# Patient Record
Sex: FEMALE | Race: BLACK OR AFRICAN AMERICAN | NOT HISPANIC OR LATINO
[De-identification: names, ages, dates, MRNs, and addresses within clinical notes are randomized per-mention and may not be internally consistent; named-entity substitution may affect disease eponyms.]

---

## 2017-10-02 ENCOUNTER — RESULT REVIEW (OUTPATIENT)
Age: 22
End: 2017-10-02

## 2017-10-19 PROBLEM — Z00.00 ENCOUNTER FOR PREVENTIVE HEALTH EXAMINATION: Status: ACTIVE | Noted: 2017-10-19

## 2017-11-07 ENCOUNTER — APPOINTMENT (OUTPATIENT)
Dept: ANTEPARTUM | Facility: CLINIC | Age: 22
End: 2017-11-07
Payer: COMMERCIAL

## 2017-11-07 ENCOUNTER — ASOB RESULT (OUTPATIENT)
Age: 22
End: 2017-11-07

## 2017-11-07 PROCEDURE — 76811 OB US DETAILED SNGL FETUS: CPT

## 2017-12-05 ENCOUNTER — APPOINTMENT (OUTPATIENT)
Dept: ANTEPARTUM | Facility: CLINIC | Age: 22
End: 2017-12-05
Payer: COMMERCIAL

## 2017-12-05 ENCOUNTER — ASOB RESULT (OUTPATIENT)
Age: 22
End: 2017-12-05

## 2017-12-05 ENCOUNTER — APPOINTMENT (OUTPATIENT)
Dept: ANTEPARTUM | Facility: HOSPITAL | Age: 22
End: 2017-12-05
Payer: COMMERCIAL

## 2017-12-05 PROCEDURE — 76816 OB US FOLLOW-UP PER FETUS: CPT

## 2017-12-05 PROCEDURE — 99203 OFFICE O/P NEW LOW 30 MIN: CPT | Mod: 25

## 2018-01-02 ENCOUNTER — APPOINTMENT (OUTPATIENT)
Dept: ANTEPARTUM | Facility: CLINIC | Age: 23
End: 2018-01-02
Payer: COMMERCIAL

## 2018-01-02 ENCOUNTER — ASOB RESULT (OUTPATIENT)
Age: 23
End: 2018-01-02

## 2018-01-02 PROCEDURE — 76816 OB US FOLLOW-UP PER FETUS: CPT

## 2018-01-02 PROCEDURE — 76819 FETAL BIOPHYS PROFIL W/O NST: CPT

## 2018-01-08 ENCOUNTER — APPOINTMENT (OUTPATIENT)
Dept: ANTEPARTUM | Facility: CLINIC | Age: 23
End: 2018-01-08
Payer: COMMERCIAL

## 2018-01-08 ENCOUNTER — ASOB RESULT (OUTPATIENT)
Age: 23
End: 2018-01-08

## 2018-01-08 PROCEDURE — 76819 FETAL BIOPHYS PROFIL W/O NST: CPT

## 2018-01-30 ENCOUNTER — ASOB RESULT (OUTPATIENT)
Age: 23
End: 2018-01-30

## 2018-01-30 ENCOUNTER — APPOINTMENT (OUTPATIENT)
Dept: ANTEPARTUM | Facility: HOSPITAL | Age: 23
End: 2018-01-30

## 2018-01-30 ENCOUNTER — OUTPATIENT (OUTPATIENT)
Dept: OUTPATIENT SERVICES | Facility: HOSPITAL | Age: 23
LOS: 1 days | End: 2018-01-30

## 2018-01-30 ENCOUNTER — APPOINTMENT (OUTPATIENT)
Dept: ANTEPARTUM | Facility: CLINIC | Age: 23
End: 2018-01-30
Payer: COMMERCIAL

## 2018-01-30 PROCEDURE — 76816 OB US FOLLOW-UP PER FETUS: CPT

## 2018-01-30 PROCEDURE — 76818 FETAL BIOPHYS PROFILE W/NST: CPT | Mod: 26

## 2018-02-02 DIAGNOSIS — O09.292 SUPERVISION OF PREGNANCY WITH OTHER POOR REPRODUCTIVE OR OBSTETRIC HISTORY, SECOND TRIMESTER: ICD-10-CM

## 2018-02-02 DIAGNOSIS — Z3A.32 32 WEEKS GESTATION OF PREGNANCY: ICD-10-CM

## 2018-02-06 ENCOUNTER — ASOB RESULT (OUTPATIENT)
Age: 23
End: 2018-02-06

## 2018-02-06 ENCOUNTER — OUTPATIENT (OUTPATIENT)
Dept: OUTPATIENT SERVICES | Facility: HOSPITAL | Age: 23
LOS: 1 days | End: 2018-02-06

## 2018-02-06 ENCOUNTER — APPOINTMENT (OUTPATIENT)
Dept: ANTEPARTUM | Facility: CLINIC | Age: 23
End: 2018-02-06
Payer: COMMERCIAL

## 2018-02-06 ENCOUNTER — APPOINTMENT (OUTPATIENT)
Dept: ANTEPARTUM | Facility: HOSPITAL | Age: 23
End: 2018-02-06

## 2018-02-06 PROCEDURE — 76818 FETAL BIOPHYS PROFILE W/NST: CPT | Mod: 26

## 2018-02-13 ENCOUNTER — APPOINTMENT (OUTPATIENT)
Dept: ANTEPARTUM | Facility: HOSPITAL | Age: 23
End: 2018-02-13

## 2018-02-13 ENCOUNTER — ASOB RESULT (OUTPATIENT)
Age: 23
End: 2018-02-13

## 2018-02-13 ENCOUNTER — APPOINTMENT (OUTPATIENT)
Dept: ANTEPARTUM | Facility: CLINIC | Age: 23
End: 2018-02-13
Payer: COMMERCIAL

## 2018-02-13 ENCOUNTER — OUTPATIENT (OUTPATIENT)
Dept: OUTPATIENT SERVICES | Facility: HOSPITAL | Age: 23
LOS: 1 days | End: 2018-02-13

## 2018-02-13 DIAGNOSIS — O09.292 SUPERVISION OF PREGNANCY WITH OTHER POOR REPRODUCTIVE OR OBSTETRIC HISTORY, SECOND TRIMESTER: ICD-10-CM

## 2018-02-13 DIAGNOSIS — Z3A.33 33 WEEKS GESTATION OF PREGNANCY: ICD-10-CM

## 2018-02-13 PROCEDURE — 76818 FETAL BIOPHYS PROFILE W/NST: CPT | Mod: 26

## 2018-02-20 ENCOUNTER — APPOINTMENT (OUTPATIENT)
Dept: ANTEPARTUM | Facility: HOSPITAL | Age: 23
End: 2018-02-20

## 2018-02-20 ENCOUNTER — OUTPATIENT (OUTPATIENT)
Dept: OUTPATIENT SERVICES | Facility: HOSPITAL | Age: 23
LOS: 1 days | End: 2018-02-20

## 2018-02-20 ENCOUNTER — APPOINTMENT (OUTPATIENT)
Dept: ANTEPARTUM | Facility: CLINIC | Age: 23
End: 2018-02-20
Payer: COMMERCIAL

## 2018-02-20 ENCOUNTER — ASOB RESULT (OUTPATIENT)
Age: 23
End: 2018-02-20

## 2018-02-20 PROCEDURE — 76816 OB US FOLLOW-UP PER FETUS: CPT

## 2018-02-20 PROCEDURE — 76818 FETAL BIOPHYS PROFILE W/NST: CPT | Mod: 26

## 2018-02-22 DIAGNOSIS — Z3A.35 35 WEEKS GESTATION OF PREGNANCY: ICD-10-CM

## 2018-02-22 DIAGNOSIS — O09.292 SUPERVISION OF PREGNANCY WITH OTHER POOR REPRODUCTIVE OR OBSTETRIC HISTORY, SECOND TRIMESTER: ICD-10-CM

## 2018-02-27 ENCOUNTER — OUTPATIENT (OUTPATIENT)
Dept: OUTPATIENT SERVICES | Facility: HOSPITAL | Age: 23
LOS: 1 days | End: 2018-02-27

## 2018-02-27 ENCOUNTER — ASOB RESULT (OUTPATIENT)
Age: 23
End: 2018-02-27

## 2018-02-27 ENCOUNTER — APPOINTMENT (OUTPATIENT)
Dept: ANTEPARTUM | Facility: CLINIC | Age: 23
End: 2018-02-27
Payer: COMMERCIAL

## 2018-02-27 ENCOUNTER — APPOINTMENT (OUTPATIENT)
Dept: ANTEPARTUM | Facility: HOSPITAL | Age: 23
End: 2018-02-27

## 2018-02-27 PROCEDURE — 76818 FETAL BIOPHYS PROFILE W/NST: CPT | Mod: 26

## 2018-02-28 DIAGNOSIS — O40.3XX0 POLYHYDRAMNIOS, THIRD TRIMESTER, NOT APPLICABLE OR UNSPECIFIED: ICD-10-CM

## 2018-02-28 DIAGNOSIS — Z3A.34 34 WEEKS GESTATION OF PREGNANCY: ICD-10-CM

## 2018-02-28 DIAGNOSIS — O09.292 SUPERVISION OF PREGNANCY WITH OTHER POOR REPRODUCTIVE OR OBSTETRIC HISTORY, SECOND TRIMESTER: ICD-10-CM

## 2018-03-01 DIAGNOSIS — Z3A.36 36 WEEKS GESTATION OF PREGNANCY: ICD-10-CM

## 2018-03-01 DIAGNOSIS — O40.3XX0 POLYHYDRAMNIOS, THIRD TRIMESTER, NOT APPLICABLE OR UNSPECIFIED: ICD-10-CM

## 2018-03-01 DIAGNOSIS — O09.293 SUPERVISION OF PREGNANCY WITH OTHER POOR REPRODUCTIVE OR OBSTETRIC HISTORY, THIRD TRIMESTER: ICD-10-CM

## 2018-03-02 ENCOUNTER — ASOB RESULT (OUTPATIENT)
Age: 23
End: 2018-03-02

## 2018-03-02 ENCOUNTER — APPOINTMENT (OUTPATIENT)
Dept: ANTEPARTUM | Facility: HOSPITAL | Age: 23
End: 2018-03-02
Payer: COMMERCIAL

## 2018-03-02 ENCOUNTER — OUTPATIENT (OUTPATIENT)
Dept: OUTPATIENT SERVICES | Facility: HOSPITAL | Age: 23
LOS: 1 days | End: 2018-03-02

## 2018-03-02 PROCEDURE — 59025 FETAL NON-STRESS TEST: CPT | Mod: 26

## 2018-03-06 ENCOUNTER — OUTPATIENT (OUTPATIENT)
Dept: OUTPATIENT SERVICES | Facility: HOSPITAL | Age: 23
LOS: 1 days | End: 2018-03-06

## 2018-03-06 ENCOUNTER — ASOB RESULT (OUTPATIENT)
Age: 23
End: 2018-03-06

## 2018-03-06 ENCOUNTER — APPOINTMENT (OUTPATIENT)
Dept: ANTEPARTUM | Facility: HOSPITAL | Age: 23
End: 2018-03-06

## 2018-03-06 ENCOUNTER — APPOINTMENT (OUTPATIENT)
Dept: ANTEPARTUM | Facility: CLINIC | Age: 23
End: 2018-03-06
Payer: COMMERCIAL

## 2018-03-06 PROCEDURE — 76818 FETAL BIOPHYS PROFILE W/NST: CPT | Mod: 26

## 2018-03-08 DIAGNOSIS — Z3A.37 37 WEEKS GESTATION OF PREGNANCY: ICD-10-CM

## 2018-03-08 DIAGNOSIS — O09.292 SUPERVISION OF PREGNANCY WITH OTHER POOR REPRODUCTIVE OR OBSTETRIC HISTORY, SECOND TRIMESTER: ICD-10-CM

## 2018-03-08 DIAGNOSIS — O40.3XX0 POLYHYDRAMNIOS, THIRD TRIMESTER, NOT APPLICABLE OR UNSPECIFIED: ICD-10-CM

## 2018-03-09 ENCOUNTER — ASOB RESULT (OUTPATIENT)
Age: 23
End: 2018-03-09

## 2018-03-09 ENCOUNTER — APPOINTMENT (OUTPATIENT)
Dept: ANTEPARTUM | Facility: HOSPITAL | Age: 23
End: 2018-03-09
Payer: COMMERCIAL

## 2018-03-09 ENCOUNTER — APPOINTMENT (OUTPATIENT)
Dept: ANTEPARTUM | Facility: CLINIC | Age: 23
End: 2018-03-09

## 2018-03-09 ENCOUNTER — OUTPATIENT (OUTPATIENT)
Dept: OUTPATIENT SERVICES | Facility: HOSPITAL | Age: 23
LOS: 1 days | End: 2018-03-09

## 2018-03-09 PROCEDURE — 59025 FETAL NON-STRESS TEST: CPT | Mod: 26

## 2018-03-13 ENCOUNTER — ASOB RESULT (OUTPATIENT)
Age: 23
End: 2018-03-13

## 2018-03-13 ENCOUNTER — APPOINTMENT (OUTPATIENT)
Dept: ANTEPARTUM | Facility: CLINIC | Age: 23
End: 2018-03-13
Payer: COMMERCIAL

## 2018-03-13 ENCOUNTER — OUTPATIENT (OUTPATIENT)
Dept: OUTPATIENT SERVICES | Facility: HOSPITAL | Age: 23
LOS: 1 days | End: 2018-03-13

## 2018-03-13 ENCOUNTER — APPOINTMENT (OUTPATIENT)
Dept: ANTEPARTUM | Facility: HOSPITAL | Age: 23
End: 2018-03-13

## 2018-03-13 DIAGNOSIS — O09.293 SUPERVISION OF PREGNANCY WITH OTHER POOR REPRODUCTIVE OR OBSTETRIC HISTORY, THIRD TRIMESTER: ICD-10-CM

## 2018-03-13 DIAGNOSIS — O40.3XX0 POLYHYDRAMNIOS, THIRD TRIMESTER, NOT APPLICABLE OR UNSPECIFIED: ICD-10-CM

## 2018-03-13 DIAGNOSIS — Z3A.37 37 WEEKS GESTATION OF PREGNANCY: ICD-10-CM

## 2018-03-13 PROCEDURE — 76818 FETAL BIOPHYS PROFILE W/NST: CPT | Mod: 26

## 2018-03-13 PROCEDURE — 76816 OB US FOLLOW-UP PER FETUS: CPT

## 2018-03-14 DIAGNOSIS — O09.293 SUPERVISION OF PREGNANCY WITH OTHER POOR REPRODUCTIVE OR OBSTETRIC HISTORY, THIRD TRIMESTER: ICD-10-CM

## 2018-03-14 DIAGNOSIS — Z3A.38 38 WEEKS GESTATION OF PREGNANCY: ICD-10-CM

## 2018-03-14 DIAGNOSIS — O40.3XX0 POLYHYDRAMNIOS, THIRD TRIMESTER, NOT APPLICABLE OR UNSPECIFIED: ICD-10-CM

## 2018-03-16 ENCOUNTER — OUTPATIENT (OUTPATIENT)
Dept: OUTPATIENT SERVICES | Facility: HOSPITAL | Age: 23
LOS: 1 days | End: 2018-03-16

## 2018-03-16 ENCOUNTER — ASOB RESULT (OUTPATIENT)
Age: 23
End: 2018-03-16

## 2018-03-16 ENCOUNTER — APPOINTMENT (OUTPATIENT)
Dept: ANTEPARTUM | Facility: HOSPITAL | Age: 23
End: 2018-03-16
Payer: COMMERCIAL

## 2018-03-16 PROCEDURE — 59025 FETAL NON-STRESS TEST: CPT | Mod: 26

## 2018-03-19 ENCOUNTER — OUTPATIENT (OUTPATIENT)
Dept: OUTPATIENT SERVICES | Facility: HOSPITAL | Age: 23
LOS: 1 days | End: 2018-03-19

## 2018-03-19 DIAGNOSIS — O40.3XX0 POLYHYDRAMNIOS, THIRD TRIMESTER, NOT APPLICABLE OR UNSPECIFIED: ICD-10-CM

## 2018-03-19 DIAGNOSIS — O09.293 SUPERVISION OF PREGNANCY WITH OTHER POOR REPRODUCTIVE OR OBSTETRIC HISTORY, THIRD TRIMESTER: ICD-10-CM

## 2018-03-19 DIAGNOSIS — O99.119 OTHER DISEASES OF THE BLOOD AND BLOOD-FORMING ORGANS AND CERTAIN DISORDERS INVOLVING THE IMMUNE MECHANISM COMPLICATING PREGNANCY, UNSPECIFIED TRIMESTER: ICD-10-CM

## 2018-03-19 DIAGNOSIS — Z3A.38 38 WEEKS GESTATION OF PREGNANCY: ICD-10-CM

## 2018-03-19 LAB
APPEARANCE UR: CLEAR — SIGNIFICANT CHANGE UP
BILIRUB UR-MCNC: NEGATIVE — SIGNIFICANT CHANGE UP
BLD GP AB SCN SERPL QL: NEGATIVE — SIGNIFICANT CHANGE UP
BLOOD UR QL VISUAL: NEGATIVE — SIGNIFICANT CHANGE UP
COLOR SPEC: YELLOW — SIGNIFICANT CHANGE UP
GLUCOSE UR-MCNC: 1000 — SIGNIFICANT CHANGE UP
HCT VFR BLD CALC: 33.8 % — LOW (ref 34.5–45)
HGB BLD-MCNC: 10.9 G/DL — LOW (ref 11.5–15.5)
KETONES UR-MCNC: NEGATIVE — SIGNIFICANT CHANGE UP
LEUKOCYTE ESTERASE UR-ACNC: NEGATIVE — SIGNIFICANT CHANGE UP
MCHC RBC-ENTMCNC: 27.2 PG — SIGNIFICANT CHANGE UP (ref 27–34)
MCHC RBC-ENTMCNC: 32.2 % — SIGNIFICANT CHANGE UP (ref 32–36)
MCV RBC AUTO: 84.3 FL — SIGNIFICANT CHANGE UP (ref 80–100)
MUCOUS THREADS # UR AUTO: SIGNIFICANT CHANGE UP
NITRITE UR-MCNC: NEGATIVE — SIGNIFICANT CHANGE UP
NON-SQ EPI CELLS # UR AUTO: <1 — SIGNIFICANT CHANGE UP
NRBC # FLD: 0 — SIGNIFICANT CHANGE UP
PH UR: 6.5 — SIGNIFICANT CHANGE UP (ref 4.6–8)
PLATELET # BLD AUTO: 115 K/UL — LOW (ref 150–400)
PMV BLD: 11.8 FL — SIGNIFICANT CHANGE UP (ref 7–13)
PROT UR-MCNC: 30 MG/DL — HIGH
RBC # BLD: 4.01 M/UL — SIGNIFICANT CHANGE UP (ref 3.8–5.2)
RBC # FLD: 15.7 % — HIGH (ref 10.3–14.5)
RBC CASTS # UR COMP ASSIST: SIGNIFICANT CHANGE UP (ref 0–?)
RH IG SCN BLD-IMP: POSITIVE — SIGNIFICANT CHANGE UP
SP GR SPEC: 1.02 — SIGNIFICANT CHANGE UP (ref 1–1.04)
SQUAMOUS # UR AUTO: SIGNIFICANT CHANGE UP
UROBILINOGEN FLD QL: NORMAL MG/DL — SIGNIFICANT CHANGE UP
WBC # BLD: 7.68 K/UL — SIGNIFICANT CHANGE UP (ref 3.8–10.5)
WBC # FLD AUTO: 7.68 K/UL — SIGNIFICANT CHANGE UP (ref 3.8–10.5)
WBC UR QL: SIGNIFICANT CHANGE UP (ref 0–?)

## 2018-03-20 ENCOUNTER — RESULT REVIEW (OUTPATIENT)
Age: 23
End: 2018-03-20

## 2018-03-20 ENCOUNTER — INPATIENT (INPATIENT)
Facility: HOSPITAL | Age: 23
LOS: 2 days | Discharge: HOME | End: 2018-03-23
Attending: OBSTETRICS & GYNECOLOGY | Admitting: OBSTETRICS & GYNECOLOGY
Payer: COMMERCIAL

## 2018-03-20 ENCOUNTER — TRANSCRIPTION ENCOUNTER (OUTPATIENT)
Age: 23
End: 2018-03-20

## 2018-03-20 VITALS — WEIGHT: 182.98 LBS | HEIGHT: 63 IN

## 2018-03-20 DIAGNOSIS — O99.119 OTHER DISEASES OF THE BLOOD AND BLOOD-FORMING ORGANS AND CERTAIN DISORDERS INVOLVING THE IMMUNE MECHANISM COMPLICATING PREGNANCY, UNSPECIFIED TRIMESTER: ICD-10-CM

## 2018-03-20 LAB
BASOPHILS # BLD AUTO: 0.02 K/UL — SIGNIFICANT CHANGE UP (ref 0–0.2)
BASOPHILS NFR BLD AUTO: 0.2 % — SIGNIFICANT CHANGE UP (ref 0–2)
EOSINOPHIL # BLD AUTO: 0.05 K/UL — SIGNIFICANT CHANGE UP (ref 0–0.5)
EOSINOPHIL NFR BLD AUTO: 0.6 % — SIGNIFICANT CHANGE UP (ref 0–6)
HCT VFR BLD CALC: 35.7 % — SIGNIFICANT CHANGE UP (ref 34.5–45)
HCT VFR BLD CALC: 36.1 % — SIGNIFICANT CHANGE UP (ref 34.5–45)
HGB BLD-MCNC: 11.6 G/DL — SIGNIFICANT CHANGE UP (ref 11.5–15.5)
HGB BLD-MCNC: 11.6 G/DL — SIGNIFICANT CHANGE UP (ref 11.5–15.5)
IMM GRANULOCYTES # BLD AUTO: 0.08 # — SIGNIFICANT CHANGE UP
IMM GRANULOCYTES NFR BLD AUTO: 0.9 % — SIGNIFICANT CHANGE UP (ref 0–1.5)
LYMPHOCYTES # BLD AUTO: 1.76 K/UL — SIGNIFICANT CHANGE UP (ref 1–3.3)
LYMPHOCYTES # BLD AUTO: 19.7 % — SIGNIFICANT CHANGE UP (ref 13–44)
MCHC RBC-ENTMCNC: 26.6 PG — LOW (ref 27–34)
MCHC RBC-ENTMCNC: 27.5 PG — SIGNIFICANT CHANGE UP (ref 27–34)
MCHC RBC-ENTMCNC: 32.1 % — SIGNIFICANT CHANGE UP (ref 32–36)
MCHC RBC-ENTMCNC: 32.5 % — SIGNIFICANT CHANGE UP (ref 32–36)
MCV RBC AUTO: 82.8 FL — SIGNIFICANT CHANGE UP (ref 80–100)
MCV RBC AUTO: 84.6 FL — SIGNIFICANT CHANGE UP (ref 80–100)
MONOCYTES # BLD AUTO: 0.71 K/UL — SIGNIFICANT CHANGE UP (ref 0–0.9)
MONOCYTES NFR BLD AUTO: 8 % — SIGNIFICANT CHANGE UP (ref 2–14)
NEUTROPHILS # BLD AUTO: 6.31 K/UL — SIGNIFICANT CHANGE UP (ref 1.8–7.4)
NEUTROPHILS NFR BLD AUTO: 70.6 % — SIGNIFICANT CHANGE UP (ref 43–77)
NRBC # FLD: 0 — SIGNIFICANT CHANGE UP
NRBC # FLD: 0 — SIGNIFICANT CHANGE UP
PLATELET # BLD AUTO: 102 K/UL — LOW (ref 150–400)
PLATELET # BLD AUTO: 109 K/UL — LOW (ref 150–400)
PMV BLD: 11.7 FL — SIGNIFICANT CHANGE UP (ref 7–13)
PMV BLD: 11.9 FL — SIGNIFICANT CHANGE UP (ref 7–13)
RBC # BLD: 4.22 M/UL — SIGNIFICANT CHANGE UP (ref 3.8–5.2)
RBC # BLD: 4.36 M/UL — SIGNIFICANT CHANGE UP (ref 3.8–5.2)
RBC # FLD: 15.8 % — HIGH (ref 10.3–14.5)
RBC # FLD: 16 % — HIGH (ref 10.3–14.5)
RH IG SCN BLD-IMP: POSITIVE — SIGNIFICANT CHANGE UP
T PALLIDUM AB TITR SER: NEGATIVE — SIGNIFICANT CHANGE UP
WBC # BLD: 8.32 K/UL — SIGNIFICANT CHANGE UP (ref 3.8–10.5)
WBC # BLD: 8.93 K/UL — SIGNIFICANT CHANGE UP (ref 3.8–10.5)
WBC # FLD AUTO: 8.32 K/UL — SIGNIFICANT CHANGE UP (ref 3.8–10.5)
WBC # FLD AUTO: 8.93 K/UL — SIGNIFICANT CHANGE UP (ref 3.8–10.5)

## 2018-03-20 RX ORDER — OXYTOCIN 10 UNIT/ML
333.33 VIAL (ML) INJECTION
Qty: 20 | Refills: 0 | Status: DISCONTINUED | OUTPATIENT
Start: 2018-03-20 | End: 2018-03-20

## 2018-03-20 RX ORDER — AMPICILLIN TRIHYDRATE 250 MG
CAPSULE ORAL
Qty: 0 | Refills: 0 | Status: DISCONTINUED | OUTPATIENT
Start: 2018-03-20 | End: 2018-03-21

## 2018-03-20 RX ORDER — AMPICILLIN TRIHYDRATE 250 MG
1 CAPSULE ORAL EVERY 4 HOURS
Qty: 0 | Refills: 0 | Status: DISCONTINUED | OUTPATIENT
Start: 2018-03-20 | End: 2018-03-21

## 2018-03-20 RX ORDER — AMPICILLIN TRIHYDRATE 250 MG
2 CAPSULE ORAL ONCE
Qty: 0 | Refills: 0 | Status: COMPLETED | OUTPATIENT
Start: 2018-03-20 | End: 2018-03-20

## 2018-03-20 RX ORDER — INFLUENZA VIRUS VACCINE 15; 15; 15; 15 UG/.5ML; UG/.5ML; UG/.5ML; UG/.5ML
0.5 SUSPENSION INTRAMUSCULAR ONCE
Qty: 0 | Refills: 0 | Status: DISCONTINUED | OUTPATIENT
Start: 2018-03-20 | End: 2018-03-23

## 2018-03-20 RX ORDER — OXYTOCIN 10 UNIT/ML
2 VIAL (ML) INJECTION
Qty: 30 | Refills: 0 | Status: DISCONTINUED | OUTPATIENT
Start: 2018-03-20 | End: 2018-03-21

## 2018-03-20 RX ORDER — CITRIC ACID/SODIUM CITRATE 300-500 MG
15 SOLUTION, ORAL ORAL EVERY 4 HOURS
Qty: 0 | Refills: 0 | Status: DISCONTINUED | OUTPATIENT
Start: 2018-03-20 | End: 2018-03-20

## 2018-03-20 RX ORDER — SODIUM CHLORIDE 9 MG/ML
1000 INJECTION, SOLUTION INTRAVENOUS ONCE
Qty: 0 | Refills: 0 | Status: DISCONTINUED | OUTPATIENT
Start: 2018-03-20 | End: 2018-03-20

## 2018-03-20 RX ORDER — SODIUM CHLORIDE 9 MG/ML
1000 INJECTION, SOLUTION INTRAVENOUS
Qty: 0 | Refills: 0 | Status: DISCONTINUED | OUTPATIENT
Start: 2018-03-20 | End: 2018-03-20

## 2018-03-20 RX ADMIN — Medication 108 GRAM(S): at 14:42

## 2018-03-20 RX ADMIN — Medication 216 GRAM(S): at 10:49

## 2018-03-20 RX ADMIN — Medication 108 GRAM(S): at 23:00

## 2018-03-20 RX ADMIN — Medication 108 GRAM(S): at 18:46

## 2018-03-20 RX ADMIN — Medication 2 MILLIUNIT(S)/MIN: at 23:42

## 2018-03-20 RX ADMIN — SODIUM CHLORIDE 125 MILLILITER(S): 9 INJECTION, SOLUTION INTRAVENOUS at 10:50

## 2018-03-21 LAB
ALBUMIN SERPL ELPH-MCNC: 3.5 G/DL — SIGNIFICANT CHANGE UP (ref 3.3–5)
ALP SERPL-CCNC: 145 U/L — HIGH (ref 40–120)
ALT FLD-CCNC: 16 U/L — SIGNIFICANT CHANGE UP (ref 4–33)
AST SERPL-CCNC: 30 U/L — SIGNIFICANT CHANGE UP (ref 4–32)
BILIRUB SERPL-MCNC: 0.3 MG/DL — SIGNIFICANT CHANGE UP (ref 0.2–1.2)
BUN SERPL-MCNC: 9 MG/DL — SIGNIFICANT CHANGE UP (ref 7–23)
CALCIUM SERPL-MCNC: 9 MG/DL — SIGNIFICANT CHANGE UP (ref 8.4–10.5)
CHLORIDE SERPL-SCNC: 102 MMOL/L — SIGNIFICANT CHANGE UP (ref 98–107)
CO2 SERPL-SCNC: 24 MMOL/L — SIGNIFICANT CHANGE UP (ref 22–31)
CREAT SERPL-MCNC: 0.64 MG/DL — SIGNIFICANT CHANGE UP (ref 0.5–1.3)
FIBRINOGEN PPP-MCNC: 582 MG/DL — HIGH (ref 310–510)
GLUCOSE SERPL-MCNC: 102 MG/DL — HIGH (ref 70–99)
HCT VFR BLD CALC: 35.3 % — SIGNIFICANT CHANGE UP (ref 34.5–45)
HGB BLD-MCNC: 11.6 G/DL — SIGNIFICANT CHANGE UP (ref 11.5–15.5)
INR BLD: 0.99 — SIGNIFICANT CHANGE UP (ref 0.88–1.17)
LDH SERPL L TO P-CCNC: 263 U/L — HIGH (ref 135–225)
MCHC RBC-ENTMCNC: 27.8 PG — SIGNIFICANT CHANGE UP (ref 27–34)
MCHC RBC-ENTMCNC: 32.9 % — SIGNIFICANT CHANGE UP (ref 32–36)
MCV RBC AUTO: 84.4 FL — SIGNIFICANT CHANGE UP (ref 80–100)
NRBC # FLD: 0 — SIGNIFICANT CHANGE UP
PLATELET # BLD AUTO: 104 K/UL — LOW (ref 150–400)
PMV BLD: 11.3 FL — SIGNIFICANT CHANGE UP (ref 7–13)
POTASSIUM SERPL-MCNC: 3.6 MMOL/L — SIGNIFICANT CHANGE UP (ref 3.5–5.3)
POTASSIUM SERPL-SCNC: 3.6 MMOL/L — SIGNIFICANT CHANGE UP (ref 3.5–5.3)
PROT SERPL-MCNC: 6.2 G/DL — SIGNIFICANT CHANGE UP (ref 6–8.3)
PROTHROM AB SERPL-ACNC: 11 SEC — SIGNIFICANT CHANGE UP (ref 9.8–13.1)
RBC # BLD: 4.18 M/UL — SIGNIFICANT CHANGE UP (ref 3.8–5.2)
RBC # FLD: 15.8 % — HIGH (ref 10.3–14.5)
SODIUM SERPL-SCNC: 139 MMOL/L — SIGNIFICANT CHANGE UP (ref 135–145)
T PALLIDUM AB TITR SER: NEGATIVE — SIGNIFICANT CHANGE UP
URATE SERPL-MCNC: 3.6 MG/DL — SIGNIFICANT CHANGE UP (ref 2.5–7)
WBC # BLD: 9.66 K/UL — SIGNIFICANT CHANGE UP (ref 3.8–10.5)
WBC # FLD AUTO: 9.66 K/UL — SIGNIFICANT CHANGE UP (ref 3.8–10.5)

## 2018-03-21 PROCEDURE — 88307 TISSUE EXAM BY PATHOLOGIST: CPT | Mod: 26

## 2018-03-21 RX ORDER — MAGNESIUM HYDROXIDE 400 MG/1
30 TABLET, CHEWABLE ORAL
Qty: 0 | Refills: 0 | Status: DISCONTINUED | OUTPATIENT
Start: 2018-03-21 | End: 2018-03-23

## 2018-03-21 RX ORDER — SIMETHICONE 80 MG/1
80 TABLET, CHEWABLE ORAL EVERY 6 HOURS
Qty: 0 | Refills: 0 | Status: DISCONTINUED | OUTPATIENT
Start: 2018-03-21 | End: 2018-03-23

## 2018-03-21 RX ORDER — DIBUCAINE 1 %
1 OINTMENT (GRAM) RECTAL EVERY 4 HOURS
Qty: 0 | Refills: 0 | Status: DISCONTINUED | OUTPATIENT
Start: 2018-03-21 | End: 2018-03-21

## 2018-03-21 RX ORDER — PRAMOXINE HYDROCHLORIDE 150 MG/15G
1 AEROSOL, FOAM RECTAL EVERY 4 HOURS
Qty: 0 | Refills: 0 | Status: DISCONTINUED | OUTPATIENT
Start: 2018-03-21 | End: 2018-03-21

## 2018-03-21 RX ORDER — AER TRAVELER 0.5 G/1
1 SOLUTION RECTAL; TOPICAL EVERY 4 HOURS
Qty: 0 | Refills: 0 | Status: DISCONTINUED | OUTPATIENT
Start: 2018-03-21 | End: 2018-03-23

## 2018-03-21 RX ORDER — OXYCODONE HYDROCHLORIDE 5 MG/1
5 TABLET ORAL EVERY 4 HOURS
Qty: 0 | Refills: 0 | Status: DISCONTINUED | OUTPATIENT
Start: 2018-03-21 | End: 2018-03-23

## 2018-03-21 RX ORDER — OXYTOCIN 10 UNIT/ML
41.67 VIAL (ML) INJECTION
Qty: 20 | Refills: 0 | Status: DISCONTINUED | OUTPATIENT
Start: 2018-03-21 | End: 2018-03-22

## 2018-03-21 RX ORDER — DIPHENHYDRAMINE HCL 50 MG
25 CAPSULE ORAL EVERY 6 HOURS
Qty: 0 | Refills: 0 | Status: DISCONTINUED | OUTPATIENT
Start: 2018-03-21 | End: 2018-03-23

## 2018-03-21 RX ORDER — DOCUSATE SODIUM 100 MG
100 CAPSULE ORAL
Qty: 0 | Refills: 0 | Status: DISCONTINUED | OUTPATIENT
Start: 2018-03-21 | End: 2018-03-23

## 2018-03-21 RX ORDER — HYDROCORTISONE 1 %
1 OINTMENT (GRAM) TOPICAL EVERY 4 HOURS
Qty: 0 | Refills: 0 | Status: DISCONTINUED | OUTPATIENT
Start: 2018-03-21 | End: 2018-03-21

## 2018-03-21 RX ORDER — KETOROLAC TROMETHAMINE 30 MG/ML
30 SYRINGE (ML) INJECTION ONCE
Qty: 0 | Refills: 0 | Status: DISCONTINUED | OUTPATIENT
Start: 2018-03-21 | End: 2018-03-22

## 2018-03-21 RX ORDER — IBUPROFEN 200 MG
600 TABLET ORAL EVERY 6 HOURS
Qty: 0 | Refills: 0 | Status: DISCONTINUED | OUTPATIENT
Start: 2018-03-21 | End: 2018-03-23

## 2018-03-21 RX ORDER — AER TRAVELER 0.5 G/1
1 SOLUTION RECTAL; TOPICAL EVERY 4 HOURS
Qty: 0 | Refills: 0 | Status: DISCONTINUED | OUTPATIENT
Start: 2018-03-21 | End: 2018-03-21

## 2018-03-21 RX ORDER — ACETAMINOPHEN 500 MG
975 TABLET ORAL EVERY 6 HOURS
Qty: 0 | Refills: 0 | Status: COMPLETED | OUTPATIENT
Start: 2018-03-21 | End: 2019-02-17

## 2018-03-21 RX ORDER — TETANUS TOXOID, REDUCED DIPHTHERIA TOXOID AND ACELLULAR PERTUSSIS VACCINE, ADSORBED 5; 2.5; 8; 8; 2.5 [IU]/.5ML; [IU]/.5ML; UG/.5ML; UG/.5ML; UG/.5ML
0.5 SUSPENSION INTRAMUSCULAR ONCE
Qty: 0 | Refills: 0 | Status: DISCONTINUED | OUTPATIENT
Start: 2018-03-21 | End: 2018-03-23

## 2018-03-21 RX ORDER — OXYCODONE HYDROCHLORIDE 5 MG/1
5 TABLET ORAL
Qty: 0 | Refills: 0 | Status: DISCONTINUED | OUTPATIENT
Start: 2018-03-21 | End: 2018-03-23

## 2018-03-21 RX ORDER — LANOLIN
1 OINTMENT (GRAM) TOPICAL EVERY 6 HOURS
Qty: 0 | Refills: 0 | Status: DISCONTINUED | OUTPATIENT
Start: 2018-03-21 | End: 2018-03-23

## 2018-03-21 RX ORDER — IBUPROFEN 200 MG
600 TABLET ORAL EVERY 6 HOURS
Qty: 0 | Refills: 0 | Status: COMPLETED | OUTPATIENT
Start: 2018-03-21 | End: 2019-02-17

## 2018-03-21 RX ORDER — HYDROCORTISONE 1 %
1 OINTMENT (GRAM) TOPICAL EVERY 4 HOURS
Qty: 0 | Refills: 0 | Status: DISCONTINUED | OUTPATIENT
Start: 2018-03-21 | End: 2018-03-23

## 2018-03-21 RX ORDER — PRAMOXINE HYDROCHLORIDE 150 MG/15G
1 AEROSOL, FOAM RECTAL EVERY 4 HOURS
Qty: 0 | Refills: 0 | Status: DISCONTINUED | OUTPATIENT
Start: 2018-03-21 | End: 2018-03-23

## 2018-03-21 RX ORDER — ACETAMINOPHEN 500 MG
975 TABLET ORAL EVERY 6 HOURS
Qty: 0 | Refills: 0 | Status: DISCONTINUED | OUTPATIENT
Start: 2018-03-21 | End: 2018-03-23

## 2018-03-21 RX ORDER — GLYCERIN ADULT
1 SUPPOSITORY, RECTAL RECTAL AT BEDTIME
Qty: 0 | Refills: 0 | Status: DISCONTINUED | OUTPATIENT
Start: 2018-03-21 | End: 2018-03-23

## 2018-03-21 RX ORDER — OXYTOCIN 10 UNIT/ML
41.67 VIAL (ML) INJECTION
Qty: 20 | Refills: 0 | Status: DISCONTINUED | OUTPATIENT
Start: 2018-03-21 | End: 2018-03-21

## 2018-03-21 RX ORDER — SODIUM CHLORIDE 9 MG/ML
3 INJECTION INTRAMUSCULAR; INTRAVENOUS; SUBCUTANEOUS EVERY 8 HOURS
Qty: 0 | Refills: 0 | Status: DISCONTINUED | OUTPATIENT
Start: 2018-03-21 | End: 2018-03-21

## 2018-03-21 RX ORDER — DIBUCAINE 1 %
1 OINTMENT (GRAM) RECTAL EVERY 4 HOURS
Qty: 0 | Refills: 0 | Status: DISCONTINUED | OUTPATIENT
Start: 2018-03-21 | End: 2018-03-23

## 2018-03-21 RX ORDER — SODIUM CHLORIDE 9 MG/ML
3 INJECTION INTRAMUSCULAR; INTRAVENOUS; SUBCUTANEOUS EVERY 8 HOURS
Qty: 0 | Refills: 0 | Status: DISCONTINUED | OUTPATIENT
Start: 2018-03-21 | End: 2018-03-23

## 2018-03-21 RX ADMIN — Medication 975 MILLIGRAM(S): at 06:05

## 2018-03-21 RX ADMIN — Medication 975 MILLIGRAM(S): at 05:35

## 2018-03-21 RX ADMIN — Medication 600 MILLIGRAM(S): at 14:00

## 2018-03-21 RX ADMIN — SODIUM CHLORIDE 3 MILLILITER(S): 9 INJECTION INTRAMUSCULAR; INTRAVENOUS; SUBCUTANEOUS at 22:22

## 2018-03-21 RX ADMIN — Medication 975 MILLIGRAM(S): at 13:01

## 2018-03-21 RX ADMIN — Medication 975 MILLIGRAM(S): at 14:00

## 2018-03-21 RX ADMIN — Medication 600 MILLIGRAM(S): at 13:01

## 2018-03-21 RX ADMIN — Medication 600 MILLIGRAM(S): at 22:30

## 2018-03-21 RX ADMIN — SIMETHICONE 80 MILLIGRAM(S): 80 TABLET, CHEWABLE ORAL at 22:36

## 2018-03-21 RX ADMIN — Medication 125 MILLIUNIT(S)/MIN: at 03:00

## 2018-03-21 RX ADMIN — Medication 975 MILLIGRAM(S): at 23:00

## 2018-03-21 RX ADMIN — Medication 600 MILLIGRAM(S): at 06:05

## 2018-03-21 RX ADMIN — Medication 600 MILLIGRAM(S): at 05:35

## 2018-03-21 RX ADMIN — Medication 975 MILLIGRAM(S): at 22:30

## 2018-03-21 RX ADMIN — Medication 600 MILLIGRAM(S): at 23:00

## 2018-03-21 RX ADMIN — SODIUM CHLORIDE 3 MILLILITER(S): 9 INJECTION INTRAMUSCULAR; INTRAVENOUS; SUBCUTANEOUS at 06:50

## 2018-03-21 NOTE — DISCHARGE NOTE OB - CARE PROVIDER_API CALL
Le Mcdonald (DO), Obstetrics and Gynecology  [address] [place] [address]  Phone: [phone]  Fax: [phone]

## 2018-03-21 NOTE — DISCHARGE NOTE OB - HOSPITAL COURSE
patient was admitted for IOL. She underwent vaginal delivery of a viable boy. Hospital course was uncomplicated. Patient discharged home to follow up with obstetrician.

## 2018-03-21 NOTE — DISCHARGE NOTE OB - PATIENT PORTAL LINK FT
You can access the [facility] Patient Portal, offered by [facility], by registering with the following website: [email or web address]

## 2018-03-21 NOTE — DISCHARGE NOTE OB - CARE PLAN
Principal Discharge DX:	Term birth of male   Goal:	full recovery  Assessment and plan of treatment:	Pelvic rest x 6wks  Regular diet  Continue prenatal vitamins

## 2018-03-21 NOTE — DISCHARGE NOTE OB - MEDICATION SUMMARY - MEDICATIONS TO TAKE
I will START or STAY ON the medications listed below when I get home from the hospital:    acetaminophen 325 mg oral tablet  -- 3 tab(s) by mouth every 6 hours  -- Indication: For pain    ibuprofen 600 mg oral tablet  -- 1 tab(s) by mouth every 6 hours  -- Indication: For pain    Prenatal Multivitamins with Folic Acid 1 mg oral tablet  -- 1 tab(s) by mouth once a day  -- Indication: For vitamins

## 2018-03-21 NOTE — PROGRESS NOTE ADULT - ASSESSMENT
Assessment: and Plan:    1. PPD#0 s/p  after IOL due to High risk Pregnancy (Hx of PEC and IUFD with 1st pregnancy)  2. Gest. Thrombocytopenia  3. Labile SBP >140    Pt. has hx of PEC and IUFD  Will check PEC labs,   ,0000  Will need Magnesium prophylaxis if Abn. labs  Continue current care  Needs Circ for Kismet tomorrow if cleared  Otherwise stable postpartum  Breastfeeding well

## 2018-03-21 NOTE — PROGRESS NOTE ADULT - SUBJECTIVE AND OBJECTIVE BOX
OB Attending on call: PPD # 0, s/p     Patient evaluated at bedside.   Patient's pain is well controlled with motrin and tylenol. Pt. denies any RUQ pain, visual disturbances, headache, dizziness, chest pain, palpitations, shortness of breath, nausea, vomiting, heavy vaginal bleeding or perineal discomfort.  Patient has been ambulating without assistance, voiding spontaneously, tolerating diet, and is breastfeeding.    Physical Exam:  Vital Signs Last 24 Hrs  T(C): 37.1 (21 Mar 2018 14:05), Max: 37.5 (21 Mar 2018 05:03)  T(F): 98.8 (21 Mar 2018 14:05), Max: 99.5 (21 Mar 2018 05:03)  HR: 99 (21 Mar 2018 14:05) (95 - 110)  BP: 142/82 (21 Mar 2018 14:05) (130/71 - 147/82)  BP(mean): --  RR: 18 (21 Mar 2018 14:05) (17 - 18)  SpO2: 99% (21 Mar 2018 14:05) (98% - 100%)  I&O's Summary    20 Mar 2018 07:01  -  21 Mar 2018 07:00  --------------------------------------------------------  IN: 750 mL / OUT: 850 mL / NET: -100 mL    21 Mar 2018 07:  -  21 Mar 2018 14:55  --------------------------------------------------------  IN: 0 mL / OUT: 400 mL / NET: -400 mL        NAD, A+0 x 3  CVS: Positive S1S2, Mild Tachycardia  Lungs: CTA B/L, No W/R/R  Breasts: soft, nontender, no palpable masses, nipples intact and everted  Abd:  soft, nontender, nondistended, no rebound or guarding, uterus firm at midline,   : lochia WNL  Extremities: no edema or calf tenderness bilaterally                        11.6   9.66  )-----------( 104      ( 21 Mar 2018 02:45 )             35.3                         11.6   8.32  )-----------( 102      ( 20 Mar 2018 19:30 )             36.1                         11.6   8.93  )-----------( 109      ( 20 Mar 2018 10:00 )             35.7                         10.9   7.68  )-----------( 115      ( 19 Mar 2018 18:15 )             33.8

## 2018-03-22 RX ORDER — IBUPROFEN 200 MG
1 TABLET ORAL
Qty: 0 | Refills: 0 | DISCHARGE
Start: 2018-03-22

## 2018-03-22 RX ORDER — ACETAMINOPHEN 500 MG
3 TABLET ORAL
Qty: 0 | Refills: 0 | DISCHARGE
Start: 2018-03-22

## 2018-03-22 RX ADMIN — Medication 600 MILLIGRAM(S): at 11:20

## 2018-03-22 RX ADMIN — SODIUM CHLORIDE 3 MILLILITER(S): 9 INJECTION INTRAMUSCULAR; INTRAVENOUS; SUBCUTANEOUS at 06:10

## 2018-03-22 RX ADMIN — Medication 600 MILLIGRAM(S): at 23:30

## 2018-03-22 RX ADMIN — Medication 975 MILLIGRAM(S): at 16:15

## 2018-03-22 RX ADMIN — Medication 975 MILLIGRAM(S): at 22:56

## 2018-03-22 RX ADMIN — Medication 600 MILLIGRAM(S): at 22:56

## 2018-03-22 RX ADMIN — Medication 975 MILLIGRAM(S): at 23:30

## 2018-03-22 RX ADMIN — Medication 1 TABLET(S): at 10:41

## 2018-03-22 RX ADMIN — Medication 975 MILLIGRAM(S): at 11:20

## 2018-03-22 RX ADMIN — Medication 975 MILLIGRAM(S): at 04:32

## 2018-03-22 RX ADMIN — Medication 975 MILLIGRAM(S): at 10:41

## 2018-03-22 RX ADMIN — Medication 600 MILLIGRAM(S): at 15:32

## 2018-03-22 RX ADMIN — Medication 975 MILLIGRAM(S): at 15:32

## 2018-03-22 RX ADMIN — Medication 600 MILLIGRAM(S): at 04:03

## 2018-03-22 RX ADMIN — Medication 600 MILLIGRAM(S): at 04:32

## 2018-03-22 RX ADMIN — Medication 600 MILLIGRAM(S): at 10:41

## 2018-03-22 RX ADMIN — Medication 975 MILLIGRAM(S): at 04:02

## 2018-03-22 RX ADMIN — Medication 100 MILLIGRAM(S): at 10:41

## 2018-03-22 RX ADMIN — Medication 600 MILLIGRAM(S): at 16:15

## 2018-03-22 NOTE — PROGRESS NOTE ADULT - SUBJECTIVE AND OBJECTIVE BOX
S: 22y PPD#1  Patient doing well. Minimal to moderate. lochia. Pain controlled. Voiding. Passing Flatus. Tolerating a regular diet.     O: Vital Signs Last 24 Hrs  T(C): 36.9 (22 Mar 2018 10:13), Max: 37.1 (21 Mar 2018 14:05)  T(F): 98.4 (22 Mar 2018 10:13), Max: 98.8 (21 Mar 2018 14:05)  HR: 102 (22 Mar 2018 10:13) (83 - 105)  BP: 138/88 (22 Mar 2018 10:13) (121/72 - 142/82)  RR: 18 (22 Mar 2018 10:13) (18 - 18)  SpO2: 99% (22 Mar 2018 10:13) (99% - 100%)    Gen: NAD  Abd: soft, NT, ND, fundus firm below umbilicus  Lochia: moderate  Ext: no tenderness    Labs:                        11.6   9.66  )-----------( 104      ( 21 Mar 2018 02:45 ), platelets, stable              35.3         A: 22y PPD#1 s/p  doing well. Circ. done.  Plan: Increase ambulation. Continue Regular diet. Tylenol and Motrin q6 hrs. Oxycodone prn.

## 2018-03-22 NOTE — PROGRESS NOTE ADULT - SUBJECTIVE AND OBJECTIVE BOX
Anesthesia Post-op Note    Patient s/p  with epidural yesterday.     No residual anesthetic issues or complications noted.

## 2018-03-23 VITALS
DIASTOLIC BLOOD PRESSURE: 82 MMHG | OXYGEN SATURATION: 100 % | SYSTOLIC BLOOD PRESSURE: 135 MMHG | RESPIRATION RATE: 18 BRPM | HEART RATE: 91 BPM | TEMPERATURE: 98 F

## 2018-03-23 RX ADMIN — Medication 600 MILLIGRAM(S): at 05:45

## 2018-03-23 RX ADMIN — Medication 975 MILLIGRAM(S): at 06:15

## 2018-03-23 RX ADMIN — Medication 600 MILLIGRAM(S): at 06:15

## 2018-03-23 RX ADMIN — Medication 975 MILLIGRAM(S): at 05:45

## 2018-03-23 NOTE — PROGRESS NOTE ADULT - SUBJECTIVE AND OBJECTIVE BOX
PPD #2  Patient evaluated at bedside.   Patient's pain is well controlled   Patient denies headache, dizziness, chest pain, palpitations, shortness of breath, nausea, vomiting, heavy vaginal bleeding or perineal discomfort.  Patient has been ambulating without assistance, voiding spontaneously, tolerating diet    Physical Exam:  Vital Signs Last 24 Hrs  T(C): 36.8 (23 Mar 2018 05:56), Max: 36.9 (22 Mar 2018 10:13)  T(F): 98.2 (23 Mar 2018 05:56), Max: 98.4 (22 Mar 2018 10:13)  HR: 91 (23 Mar 2018 05:56) (91 - 102)  BP: 135/82 (23 Mar 2018 05:56) (135/82 - 138/88)  BP(mean): --  RR: 18 (23 Mar 2018 05:56) (18 - 18)  SpO2: 100% (23 Mar 2018 05:56) (99% - 100%)  I&O's Summary    NAD, A+0 x 3    lochia WNL                        11.6   9.66  )-----------( 104      ( 21 Mar 2018 02:45 )             35.3                         11.6   8.32  )-----------( 102      ( 20 Mar 2018 19:30 )             36.1                         11.6   8.93  )-----------( 109      ( 20 Mar 2018 10:00 )             35.7                         10.9   7.68  )-----------( 115      ( 19 Mar 2018 18:15 )             33.8     03-21    139  |  102  |  9   ----------------------------<  102<H>  3.6   |  24  |  0.64    Ca    9.0      21 Mar 2018 16:05    TPro  6.2  /  Alb  3.5  /  TBili  0.3  /  DBili  x   /  AST  30  /  ALT  16  /  AlkPhos  145<H>  03-21    PT/INR - ( 21 Mar 2018 16:05 )   PT: 11.0 SEC;   INR: 0.99                Assessment:  stable postpartum    Plan:  encourage ambulation and po fluids  Encourage breastfeeding  discharge home

## 2018-03-27 DIAGNOSIS — O40.3XX0 POLYHYDRAMNIOS, THIRD TRIMESTER, NOT APPLICABLE OR UNSPECIFIED: ICD-10-CM

## 2018-03-27 DIAGNOSIS — O09.292 SUPERVISION OF PREGNANCY WITH OTHER POOR REPRODUCTIVE OR OBSTETRIC HISTORY, SECOND TRIMESTER: ICD-10-CM

## 2018-03-27 DIAGNOSIS — Z3A.39 39 WEEKS GESTATION OF PREGNANCY: ICD-10-CM

## 2018-04-28 LAB — SURGICAL PATHOLOGY STUDY: SIGNIFICANT CHANGE UP

## 2019-09-26 ENCOUNTER — ASOB RESULT (OUTPATIENT)
Age: 24
End: 2019-09-26

## 2019-09-26 ENCOUNTER — APPOINTMENT (OUTPATIENT)
Dept: ANTEPARTUM | Facility: CLINIC | Age: 24
End: 2019-09-26
Payer: COMMERCIAL

## 2019-09-26 PROCEDURE — 76811 OB US DETAILED SNGL FETUS: CPT

## 2019-09-26 PROCEDURE — 99214 OFFICE O/P EST MOD 30 MIN: CPT | Mod: 25

## 2019-09-26 PROCEDURE — 76819 FETAL BIOPHYS PROFIL W/O NST: CPT

## 2019-10-17 ENCOUNTER — APPOINTMENT (OUTPATIENT)
Dept: ANTEPARTUM | Facility: CLINIC | Age: 24
End: 2019-10-17

## 2019-10-28 ENCOUNTER — APPOINTMENT (OUTPATIENT)
Dept: ANTEPARTUM | Facility: CLINIC | Age: 24
End: 2019-10-28

## 2019-11-04 ENCOUNTER — APPOINTMENT (OUTPATIENT)
Dept: ANTEPARTUM | Facility: CLINIC | Age: 24
End: 2019-11-04

## 2019-12-12 ENCOUNTER — OUTPATIENT (OUTPATIENT)
Dept: OUTPATIENT SERVICES | Facility: HOSPITAL | Age: 24
LOS: 1 days | End: 2019-12-12

## 2019-12-12 ENCOUNTER — APPOINTMENT (OUTPATIENT)
Dept: ANTEPARTUM | Facility: CLINIC | Age: 24
End: 2019-12-12
Payer: COMMERCIAL

## 2019-12-12 ENCOUNTER — ASOB RESULT (OUTPATIENT)
Age: 24
End: 2019-12-12

## 2019-12-12 PROCEDURE — 76818 FETAL BIOPHYS PROFILE W/NST: CPT | Mod: 26

## 2019-12-12 PROCEDURE — 76816 OB US FOLLOW-UP PER FETUS: CPT

## 2019-12-13 ENCOUNTER — INPATIENT (INPATIENT)
Facility: HOSPITAL | Age: 24
LOS: 2 days | Discharge: HOME | End: 2019-12-16
Attending: OBSTETRICS & GYNECOLOGY | Admitting: OBSTETRICS & GYNECOLOGY

## 2019-12-13 VITALS — SYSTOLIC BLOOD PRESSURE: 127 MMHG | DIASTOLIC BLOOD PRESSURE: 69 MMHG | HEART RATE: 111 BPM

## 2019-12-13 DIAGNOSIS — Z98.890 OTHER SPECIFIED POSTPROCEDURAL STATES: Chronic | ICD-10-CM

## 2019-12-13 DIAGNOSIS — O09.299 SUPERVISION OF PREGNANCY WITH OTHER POOR REPRODUCTIVE OR OBSTETRIC HISTORY, UNSPECIFIED TRIMESTER: ICD-10-CM

## 2019-12-13 LAB
ALBUMIN SERPL ELPH-MCNC: 3.6 G/DL — SIGNIFICANT CHANGE UP (ref 3.3–5)
ALP SERPL-CCNC: 129 U/L — HIGH (ref 40–120)
ALT FLD-CCNC: 12 U/L — SIGNIFICANT CHANGE UP (ref 4–33)
ANION GAP SERPL CALC-SCNC: 14 MMO/L — SIGNIFICANT CHANGE UP (ref 7–14)
APPEARANCE UR: SIGNIFICANT CHANGE UP
APTT BLD: 29.5 SEC — SIGNIFICANT CHANGE UP (ref 27.5–36.3)
AST SERPL-CCNC: 25 U/L — SIGNIFICANT CHANGE UP (ref 4–32)
BACTERIA # UR AUTO: HIGH
BASOPHILS # BLD AUTO: 0.03 K/UL — SIGNIFICANT CHANGE UP (ref 0–0.2)
BASOPHILS NFR BLD AUTO: 0.3 % — SIGNIFICANT CHANGE UP (ref 0–2)
BILIRUB SERPL-MCNC: 0.3 MG/DL — SIGNIFICANT CHANGE UP (ref 0.2–1.2)
BILIRUB UR-MCNC: NEGATIVE — SIGNIFICANT CHANGE UP
BLD GP AB SCN SERPL QL: NEGATIVE — SIGNIFICANT CHANGE UP
BLOOD UR QL VISUAL: HIGH
BUN SERPL-MCNC: 10 MG/DL — SIGNIFICANT CHANGE UP (ref 7–23)
CALCIUM SERPL-MCNC: 9.6 MG/DL — SIGNIFICANT CHANGE UP (ref 8.4–10.5)
CHLORIDE SERPL-SCNC: 102 MMOL/L — SIGNIFICANT CHANGE UP (ref 98–107)
CO2 SERPL-SCNC: 21 MMOL/L — LOW (ref 22–31)
COLOR SPEC: SIGNIFICANT CHANGE UP
CREAT ?TM UR-MCNC: 67.4 MG/DL — SIGNIFICANT CHANGE UP
CREAT SERPL-MCNC: 0.57 MG/DL — SIGNIFICANT CHANGE UP (ref 0.5–1.3)
EOSINOPHIL # BLD AUTO: 0.04 K/UL — SIGNIFICANT CHANGE UP (ref 0–0.5)
EOSINOPHIL NFR BLD AUTO: 0.4 % — SIGNIFICANT CHANGE UP (ref 0–6)
FIBRINOGEN PPP-MCNC: 633.1 MG/DL — HIGH (ref 350–510)
GLUCOSE SERPL-MCNC: 117 MG/DL — HIGH (ref 70–99)
GLUCOSE UR-MCNC: NEGATIVE — SIGNIFICANT CHANGE UP
HCT VFR BLD CALC: 31.6 % — LOW (ref 34.5–45)
HGB BLD-MCNC: 9.4 G/DL — LOW (ref 11.5–15.5)
HYALINE CASTS # UR AUTO: HIGH
IMM GRANULOCYTES NFR BLD AUTO: 0.6 % — SIGNIFICANT CHANGE UP (ref 0–1.5)
INR BLD: 1.01 — SIGNIFICANT CHANGE UP (ref 0.88–1.17)
KETONES UR-MCNC: NEGATIVE — SIGNIFICANT CHANGE UP
LDH SERPL L TO P-CCNC: 217 U/L — SIGNIFICANT CHANGE UP (ref 135–225)
LEUKOCYTE ESTERASE UR-ACNC: SIGNIFICANT CHANGE UP
LYMPHOCYTES # BLD AUTO: 2.42 K/UL — SIGNIFICANT CHANGE UP (ref 1–3.3)
LYMPHOCYTES # BLD AUTO: 24.2 % — SIGNIFICANT CHANGE UP (ref 13–44)
MCHC RBC-ENTMCNC: 23.9 PG — LOW (ref 27–34)
MCHC RBC-ENTMCNC: 29.7 % — LOW (ref 32–36)
MCV RBC AUTO: 80.4 FL — SIGNIFICANT CHANGE UP (ref 80–100)
MONOCYTES # BLD AUTO: 0.54 K/UL — SIGNIFICANT CHANGE UP (ref 0–0.9)
MONOCYTES NFR BLD AUTO: 5.4 % — SIGNIFICANT CHANGE UP (ref 2–14)
NEUTROPHILS # BLD AUTO: 6.93 K/UL — SIGNIFICANT CHANGE UP (ref 1.8–7.4)
NEUTROPHILS NFR BLD AUTO: 69.1 % — SIGNIFICANT CHANGE UP (ref 43–77)
NITRITE UR-MCNC: NEGATIVE — SIGNIFICANT CHANGE UP
NRBC # FLD: 0 K/UL — SIGNIFICANT CHANGE UP (ref 0–0)
PH UR: 7 — SIGNIFICANT CHANGE UP (ref 5–8)
PLATELET # BLD AUTO: 148 K/UL — LOW (ref 150–400)
PMV BLD: 12.7 FL — SIGNIFICANT CHANGE UP (ref 7–13)
POTASSIUM SERPL-MCNC: 3.9 MMOL/L — SIGNIFICANT CHANGE UP (ref 3.5–5.3)
POTASSIUM SERPL-SCNC: 3.9 MMOL/L — SIGNIFICANT CHANGE UP (ref 3.5–5.3)
PROT SERPL-MCNC: 7.2 G/DL — SIGNIFICANT CHANGE UP (ref 6–8.3)
PROT UR-MCNC: 20 — SIGNIFICANT CHANGE UP
PROT UR-MCNC: 41.3 MG/DL — SIGNIFICANT CHANGE UP
PROTHROM AB SERPL-ACNC: 11.2 SEC — SIGNIFICANT CHANGE UP (ref 9.8–13.1)
RBC # BLD: 3.93 M/UL — SIGNIFICANT CHANGE UP (ref 3.8–5.2)
RBC # FLD: 16.2 % — HIGH (ref 10.3–14.5)
RBC CASTS # UR COMP ASSIST: HIGH (ref 0–?)
RH IG SCN BLD-IMP: POSITIVE — SIGNIFICANT CHANGE UP
SODIUM SERPL-SCNC: 137 MMOL/L — SIGNIFICANT CHANGE UP (ref 135–145)
SP GR SPEC: 1.02 — SIGNIFICANT CHANGE UP (ref 1–1.04)
SQUAMOUS # UR AUTO: SIGNIFICANT CHANGE UP
URATE SERPL-MCNC: 3.2 MG/DL — SIGNIFICANT CHANGE UP (ref 2.5–7)
UROBILINOGEN FLD QL: NORMAL — SIGNIFICANT CHANGE UP
WBC # BLD: 10.02 K/UL — SIGNIFICANT CHANGE UP (ref 3.8–10.5)
WBC # FLD AUTO: 10.02 K/UL — SIGNIFICANT CHANGE UP (ref 3.8–10.5)
WBC UR QL: HIGH (ref 0–?)

## 2019-12-13 RX ORDER — OXYTOCIN 10 UNIT/ML
333.33 VIAL (ML) INJECTION
Qty: 20 | Refills: 0 | Status: DISCONTINUED | OUTPATIENT
Start: 2019-12-13 | End: 2019-12-16

## 2019-12-13 RX ORDER — SODIUM CHLORIDE 9 MG/ML
1000 INJECTION, SOLUTION INTRAVENOUS
Refills: 0 | Status: DISCONTINUED | OUTPATIENT
Start: 2019-12-13 | End: 2019-12-14

## 2019-12-13 RX ORDER — AMPICILLIN TRIHYDRATE 250 MG
1 CAPSULE ORAL EVERY 4 HOURS
Refills: 0 | Status: DISCONTINUED | OUTPATIENT
Start: 2019-12-13 | End: 2019-12-14

## 2019-12-13 RX ORDER — AMPICILLIN TRIHYDRATE 250 MG
2 CAPSULE ORAL ONCE
Refills: 0 | Status: COMPLETED | OUTPATIENT
Start: 2019-12-13 | End: 2019-12-13

## 2019-12-13 RX ORDER — BUTORPHANOL TARTRATE 2 MG/ML
2 INJECTION, SOLUTION INTRAMUSCULAR; INTRAVENOUS ONCE
Refills: 0 | Status: DISCONTINUED | OUTPATIENT
Start: 2019-12-13 | End: 2019-12-13

## 2019-12-13 RX ORDER — CITRIC ACID/SODIUM CITRATE 300-500 MG
15 SOLUTION, ORAL ORAL EVERY 6 HOURS
Refills: 0 | Status: DISCONTINUED | OUTPATIENT
Start: 2019-12-13 | End: 2019-12-14

## 2019-12-13 RX ADMIN — BUTORPHANOL TARTRATE 2 MILLIGRAM(S): 2 INJECTION, SOLUTION INTRAMUSCULAR; INTRAVENOUS at 18:21

## 2019-12-13 RX ADMIN — Medication 216 GRAM(S): at 18:10

## 2019-12-13 RX ADMIN — BUTORPHANOL TARTRATE 2 MILLIGRAM(S): 2 INJECTION, SOLUTION INTRAMUSCULAR; INTRAVENOUS at 18:10

## 2019-12-13 RX ADMIN — Medication 108 GRAM(S): at 22:08

## 2019-12-13 NOTE — OB PROVIDER H&P - ALERT: PERTINENT HISTORY
BioPhysical Profile(s)/Follow up Sonogram for Growth/1st Trimester Sonogram/Fetal Non-Stress Test (NST)

## 2019-12-13 NOTE — CHART NOTE - NSCHARTNOTEFT_GEN_A_CORE
Attending Note  25 yo  with IUP 39 0/7wks ga under my care presents for induction of labor. Patient's obstetrical history is significant for previous full term fetal demise and preeclampsia and 1 full term live birth.  Patient also has history of anemia and gestational thrombocytopenia with this pregnancy and prior pregnancy and has been followed by Hematologist. Patient has been noncompliant with prenatal visits with this pregnancy as well as with Hematologist Екатерина Quintana from [facility].  Most recent platelets were 170K last week.  Patient re-asssessed at bedside. Patient is s/p one dose of vaginal cytotec 20mcg @ 14:20.  Patient reports some cramps but no pain and declines any analgesia.   VS 98.1  P 114  R 16  /63    Heent nl  Abd gravid EFW 8lbs   toco ctx q 5-7min   moderate variability category I no decelerations  labs H/H 9.4/31.6  plts 148K   A" IUP 39 0/7wks ga poor obstetrical history, admitted for induction of labor,category I FHR  P: Continue with IOL with vaginal cytotec     Anticipate      MBeauvil

## 2019-12-13 NOTE — OB PROVIDER H&P - ASSESSMENT
@ 39 weeks presents for IOL for poor OB history  - admit to L&D  - routine labs + PIH labs  - IV fluids  - vaginal cytotec for IOL    dw Dr Mcdonald

## 2019-12-13 NOTE — OB RN PATIENT PROFILE - ALERT: PERTINENT HISTORY
Follow up Sonogram for Growth/Fetal Non-Stress Test (NST)/1st Trimester Sonogram/BioPhysical Profile(s)

## 2019-12-13 NOTE — OB PROVIDER H&P - HISTORY OF PRESENT ILLNESS
Patient is a  @ 39 weeks GBS neg EFW 8#5 presenting for an IOL for poor OB history. Patient admits to irreg ctx, denies lof/vb & endorses +FM. PNC complicated by anemia per patient on Fe supplementation. Accepts blood transfusion.     POBHx: 1/9/15 fetal demise  at 40 weeks PEC 7#, 3/21/18  at 39 weeks 8#  Gynhx: denies  MedHx: denies  SHx: Breast Bx '15  NKDA  Meds: PNV, ASA 81 (last dose ), Fe  Social: denies    VS  T(C): 36.7 (19 @ 13:10)  HR: 114 (19 @ 13:34)  BP: 124/63 (19 @ 13:34)  RR: 16 (19 @ 13:10)  SpO2: --    comfortable, NAD  abd soft gravid  /mod sincere/+accels/no decels   [place] occ ctx   VE /-3  vertex confirmed

## 2019-12-13 NOTE — CHART NOTE - NSCHARTNOTEFT_GEN_A_CORE
Attending Note    Patient c/o contractions /10 pain score and would like something through IV. Declines to have epidural at this time. Denies leakage of fluid or vaginal bleeding.  VS 98.1  p 114  R 16  /63    heent nl  Abd gravid, toco ctw q 2-4min   moderate variability category I  VE 2/60/-3    A: IUP 39 0/7 wks ga with poor OB history, s/p vaginal cytotec x 1, GBS+  P:  Ampicillin for GBS+      Stadol IV       Anticipate      MBeauvil

## 2019-12-13 NOTE — CHART NOTE - NSCHARTNOTEFT_GEN_A_CORE
Attending Note    Patient evaluated at bedside s/p epidural. Patient resting comfortably. Denies headache, visual changes, shortness of breath, palpitations.   VS 98.7  P  112 R 16  /85  O 2sat 98% RA    Heent nl  Abd gravid, soft, toco ctx q 2-4min   moderate variability  VE 4/70/-2    A: IUP 39wks ga poor OB history Admitted for IOL s/p vaginal cytotec x 1  P: AROM with large amount of clear amniotic fluid noted     Continue Ampicillin for + GBS     Anticipate      MBeauvil

## 2019-12-14 LAB
ALBUMIN SERPL ELPH-MCNC: 3.4 G/DL — SIGNIFICANT CHANGE UP (ref 3.3–5)
ALP SERPL-CCNC: 119 U/L — SIGNIFICANT CHANGE UP (ref 40–120)
ALT FLD-CCNC: 12 U/L — SIGNIFICANT CHANGE UP (ref 4–33)
ANION GAP SERPL CALC-SCNC: 15 MMO/L — HIGH (ref 7–14)
ANISOCYTOSIS BLD QL: SIGNIFICANT CHANGE UP
APTT BLD: 27.8 SEC — SIGNIFICANT CHANGE UP (ref 27.5–36.3)
AST SERPL-CCNC: 22 U/L — SIGNIFICANT CHANGE UP (ref 4–32)
BASOPHILS # BLD AUTO: 0.03 K/UL — SIGNIFICANT CHANGE UP (ref 0–0.2)
BASOPHILS NFR BLD AUTO: 0.2 % — SIGNIFICANT CHANGE UP (ref 0–2)
BASOPHILS NFR SPEC: 0 % — SIGNIFICANT CHANGE UP (ref 0–2)
BILIRUB SERPL-MCNC: 0.4 MG/DL — SIGNIFICANT CHANGE UP (ref 0.2–1.2)
BLASTS # FLD: 0 % — SIGNIFICANT CHANGE UP (ref 0–0)
BUN SERPL-MCNC: 8 MG/DL — SIGNIFICANT CHANGE UP (ref 7–23)
CALCIUM SERPL-MCNC: 9.2 MG/DL — SIGNIFICANT CHANGE UP (ref 8.4–10.5)
CHLORIDE SERPL-SCNC: 103 MMOL/L — SIGNIFICANT CHANGE UP (ref 98–107)
CO2 SERPL-SCNC: 18 MMOL/L — LOW (ref 22–31)
CREAT SERPL-MCNC: 0.6 MG/DL — SIGNIFICANT CHANGE UP (ref 0.5–1.3)
DACRYOCYTES BLD QL SMEAR: SLIGHT — SIGNIFICANT CHANGE UP
ELLIPTOCYTES BLD QL SMEAR: SLIGHT — SIGNIFICANT CHANGE UP
EOSINOPHIL # BLD AUTO: 0 K/UL — SIGNIFICANT CHANGE UP (ref 0–0.5)
EOSINOPHIL NFR BLD AUTO: 0 % — SIGNIFICANT CHANGE UP (ref 0–6)
EOSINOPHIL NFR FLD: 0 % — SIGNIFICANT CHANGE UP (ref 0–6)
FIBRINOGEN PPP-MCNC: 618.4 MG/DL — HIGH (ref 350–510)
GIANT PLATELETS BLD QL SMEAR: PRESENT — SIGNIFICANT CHANGE UP
GLUCOSE SERPL-MCNC: 88 MG/DL — SIGNIFICANT CHANGE UP (ref 70–99)
HCT VFR BLD CALC: 30.5 % — LOW (ref 34.5–45)
HGB BLD-MCNC: 9.6 G/DL — LOW (ref 11.5–15.5)
HYPOCHROMIA BLD QL: SLIGHT — SIGNIFICANT CHANGE UP
IMM GRANULOCYTES NFR BLD AUTO: 0.4 % — SIGNIFICANT CHANGE UP (ref 0–1.5)
INR BLD: 1.01 — SIGNIFICANT CHANGE UP (ref 0.88–1.17)
LDH SERPL L TO P-CCNC: 174 U/L — SIGNIFICANT CHANGE UP (ref 135–225)
LYMPHOCYTES # BLD AUTO: 1.46 K/UL — SIGNIFICANT CHANGE UP (ref 1–3.3)
LYMPHOCYTES # BLD AUTO: 8.8 % — LOW (ref 13–44)
LYMPHOCYTES NFR SPEC AUTO: 6.1 % — LOW (ref 13–44)
MCHC RBC-ENTMCNC: 25.1 PG — LOW (ref 27–34)
MCHC RBC-ENTMCNC: 31.5 % — LOW (ref 32–36)
MCV RBC AUTO: 79.6 FL — LOW (ref 80–100)
METAMYELOCYTES # FLD: 0 % — SIGNIFICANT CHANGE UP (ref 0–1)
MICROCYTES BLD QL: SLIGHT — SIGNIFICANT CHANGE UP
MONOCYTES # BLD AUTO: 0.75 K/UL — SIGNIFICANT CHANGE UP (ref 0–0.9)
MONOCYTES NFR BLD AUTO: 4.5 % — SIGNIFICANT CHANGE UP (ref 2–14)
MONOCYTES NFR BLD: 2.6 % — SIGNIFICANT CHANGE UP (ref 2–9)
MYELOCYTES NFR BLD: 0 % — SIGNIFICANT CHANGE UP (ref 0–0)
NEUTROPHIL AB SER-ACNC: 89.5 % — HIGH (ref 43–77)
NEUTROPHILS # BLD AUTO: 14.3 K/UL — HIGH (ref 1.8–7.4)
NEUTROPHILS NFR BLD AUTO: 86.1 % — HIGH (ref 43–77)
NEUTS BAND # BLD: 1.8 % — SIGNIFICANT CHANGE UP (ref 0–6)
NRBC # FLD: 0 K/UL — SIGNIFICANT CHANGE UP (ref 0–0)
OTHER - HEMATOLOGY %: 0 — SIGNIFICANT CHANGE UP
OVALOCYTES BLD QL SMEAR: SLIGHT — SIGNIFICANT CHANGE UP
PLATELET # BLD AUTO: 140 K/UL — LOW (ref 150–400)
PLATELET COUNT - ESTIMATE: SIGNIFICANT CHANGE UP
PMV BLD: 12.2 FL — SIGNIFICANT CHANGE UP (ref 7–13)
POIKILOCYTOSIS BLD QL AUTO: SLIGHT — SIGNIFICANT CHANGE UP
POLYCHROMASIA BLD QL SMEAR: SLIGHT — SIGNIFICANT CHANGE UP
POTASSIUM SERPL-MCNC: 3.7 MMOL/L — SIGNIFICANT CHANGE UP (ref 3.5–5.3)
POTASSIUM SERPL-SCNC: 3.7 MMOL/L — SIGNIFICANT CHANGE UP (ref 3.5–5.3)
PROMYELOCYTES # FLD: 0 % — SIGNIFICANT CHANGE UP (ref 0–0)
PROT SERPL-MCNC: 6.7 G/DL — SIGNIFICANT CHANGE UP (ref 6–8.3)
PROTHROM AB SERPL-ACNC: 11.2 SEC — SIGNIFICANT CHANGE UP (ref 9.8–13.1)
RBC # BLD: 3.83 M/UL — SIGNIFICANT CHANGE UP (ref 3.8–5.2)
RBC # FLD: 16 % — HIGH (ref 10.3–14.5)
SCHISTOCYTES BLD QL AUTO: SLIGHT — SIGNIFICANT CHANGE UP
SODIUM SERPL-SCNC: 136 MMOL/L — SIGNIFICANT CHANGE UP (ref 135–145)
T PALLIDUM AB TITR SER: NEGATIVE — SIGNIFICANT CHANGE UP
TARGETS BLD QL SMEAR: SLIGHT — SIGNIFICANT CHANGE UP
URATE SERPL-MCNC: 3.7 MG/DL — SIGNIFICANT CHANGE UP (ref 2.5–7)
VARIANT LYMPHS # BLD: 0 % — SIGNIFICANT CHANGE UP
WBC # BLD: 16.6 K/UL — HIGH (ref 3.8–10.5)
WBC # FLD AUTO: 16.6 K/UL — HIGH (ref 3.8–10.5)

## 2019-12-14 RX ORDER — MAGNESIUM HYDROXIDE 400 MG/1
30 TABLET, CHEWABLE ORAL
Refills: 0 | Status: DISCONTINUED | OUTPATIENT
Start: 2019-12-14 | End: 2019-12-16

## 2019-12-14 RX ORDER — HYDROCORTISONE 1 %
1 OINTMENT (GRAM) TOPICAL EVERY 6 HOURS
Refills: 0 | Status: DISCONTINUED | OUTPATIENT
Start: 2019-12-14 | End: 2019-12-16

## 2019-12-14 RX ORDER — SODIUM CHLORIDE 9 MG/ML
3 INJECTION INTRAMUSCULAR; INTRAVENOUS; SUBCUTANEOUS EVERY 8 HOURS
Refills: 0 | Status: DISCONTINUED | OUTPATIENT
Start: 2019-12-14 | End: 2019-12-16

## 2019-12-14 RX ORDER — KETOROLAC TROMETHAMINE 30 MG/ML
30 SYRINGE (ML) INJECTION ONCE
Refills: 0 | Status: DISCONTINUED | OUTPATIENT
Start: 2019-12-14 | End: 2019-12-14

## 2019-12-14 RX ORDER — DIPHENHYDRAMINE HCL 50 MG
25 CAPSULE ORAL EVERY 6 HOURS
Refills: 0 | Status: DISCONTINUED | OUTPATIENT
Start: 2019-12-14 | End: 2019-12-16

## 2019-12-14 RX ORDER — DIBUCAINE 1 %
1 OINTMENT (GRAM) RECTAL EVERY 6 HOURS
Refills: 0 | Status: DISCONTINUED | OUTPATIENT
Start: 2019-12-14 | End: 2019-12-16

## 2019-12-14 RX ORDER — SIMETHICONE 80 MG/1
80 TABLET, CHEWABLE ORAL EVERY 4 HOURS
Refills: 0 | Status: DISCONTINUED | OUTPATIENT
Start: 2019-12-14 | End: 2019-12-16

## 2019-12-14 RX ORDER — GLYCERIN ADULT
1 SUPPOSITORY, RECTAL RECTAL AT BEDTIME
Refills: 0 | Status: DISCONTINUED | OUTPATIENT
Start: 2019-12-14 | End: 2019-12-16

## 2019-12-14 RX ORDER — AER TRAVELER 0.5 G/1
1 SOLUTION RECTAL; TOPICAL EVERY 4 HOURS
Refills: 0 | Status: DISCONTINUED | OUTPATIENT
Start: 2019-12-14 | End: 2019-12-16

## 2019-12-14 RX ORDER — OXYCODONE HYDROCHLORIDE 5 MG/1
5 TABLET ORAL ONCE
Refills: 0 | Status: DISCONTINUED | OUTPATIENT
Start: 2019-12-14 | End: 2019-12-16

## 2019-12-14 RX ORDER — PRAMOXINE HYDROCHLORIDE 150 MG/15G
1 AEROSOL, FOAM RECTAL EVERY 4 HOURS
Refills: 0 | Status: DISCONTINUED | OUTPATIENT
Start: 2019-12-14 | End: 2019-12-16

## 2019-12-14 RX ORDER — ACETAMINOPHEN 500 MG
975 TABLET ORAL
Refills: 0 | Status: DISCONTINUED | OUTPATIENT
Start: 2019-12-14 | End: 2019-12-16

## 2019-12-14 RX ORDER — BENZOCAINE 10 %
1 GEL (GRAM) MUCOUS MEMBRANE EVERY 6 HOURS
Refills: 0 | Status: DISCONTINUED | OUTPATIENT
Start: 2019-12-14 | End: 2019-12-16

## 2019-12-14 RX ORDER — OXYTOCIN 10 UNIT/ML
333.33 VIAL (ML) INJECTION
Qty: 20 | Refills: 0 | Status: DISCONTINUED | OUTPATIENT
Start: 2019-12-14 | End: 2019-12-16

## 2019-12-14 RX ORDER — TETANUS TOXOID, REDUCED DIPHTHERIA TOXOID AND ACELLULAR PERTUSSIS VACCINE, ADSORBED 5; 2.5; 8; 8; 2.5 [IU]/.5ML; [IU]/.5ML; UG/.5ML; UG/.5ML; UG/.5ML
0.5 SUSPENSION INTRAMUSCULAR ONCE
Refills: 0 | Status: DISCONTINUED | OUTPATIENT
Start: 2019-12-14 | End: 2019-12-16

## 2019-12-14 RX ORDER — LANOLIN
1 OINTMENT (GRAM) TOPICAL EVERY 6 HOURS
Refills: 0 | Status: DISCONTINUED | OUTPATIENT
Start: 2019-12-14 | End: 2019-12-16

## 2019-12-14 RX ORDER — IBUPROFEN 200 MG
600 TABLET ORAL EVERY 6 HOURS
Refills: 0 | Status: COMPLETED | OUTPATIENT
Start: 2019-12-14 | End: 2020-11-11

## 2019-12-14 RX ORDER — OXYCODONE HYDROCHLORIDE 5 MG/1
5 TABLET ORAL
Refills: 0 | Status: DISCONTINUED | OUTPATIENT
Start: 2019-12-14 | End: 2019-12-16

## 2019-12-14 RX ORDER — IBUPROFEN 200 MG
600 TABLET ORAL EVERY 6 HOURS
Refills: 0 | Status: DISCONTINUED | OUTPATIENT
Start: 2019-12-14 | End: 2019-12-16

## 2019-12-14 RX ADMIN — Medication 1000 MILLIUNIT(S)/MIN: at 03:22

## 2019-12-14 RX ADMIN — Medication 600 MILLIGRAM(S): at 13:16

## 2019-12-14 RX ADMIN — Medication 975 MILLIGRAM(S): at 09:45

## 2019-12-14 RX ADMIN — Medication 600 MILLIGRAM(S): at 18:22

## 2019-12-14 RX ADMIN — Medication 975 MILLIGRAM(S): at 16:30

## 2019-12-14 RX ADMIN — Medication 600 MILLIGRAM(S): at 19:06

## 2019-12-14 RX ADMIN — Medication 600 MILLIGRAM(S): at 23:07

## 2019-12-14 RX ADMIN — Medication 600 MILLIGRAM(S): at 23:43

## 2019-12-14 RX ADMIN — Medication 975 MILLIGRAM(S): at 08:51

## 2019-12-14 RX ADMIN — Medication 600 MILLIGRAM(S): at 12:22

## 2019-12-14 RX ADMIN — SODIUM CHLORIDE 3 MILLILITER(S): 9 INJECTION INTRAMUSCULAR; INTRAVENOUS; SUBCUTANEOUS at 14:18

## 2019-12-14 RX ADMIN — Medication 108 GRAM(S): at 02:01

## 2019-12-14 RX ADMIN — Medication 30 MILLIGRAM(S): at 04:23

## 2019-12-14 RX ADMIN — Medication 975 MILLIGRAM(S): at 15:37

## 2019-12-14 NOTE — OB RN DELIVERY SUMMARY - NS_SEPSISRSKCALC_OBGYN_ALL_OB_FT
EOS calculated successfully. EOS Risk Factor: 0.5/1000 live births ([facility] national incidence); GA=39w1d; Temp=98.78; ROM=4.517; GBS='Positive'; Antibiotics='GBS specific antibiotics > 2 hrs prior to birth'

## 2019-12-14 NOTE — OB PROVIDER DELIVERY SUMMARY - NSPROVIDERDELIVERYNOTE_OBGYN_ALL_OB_FT
A live female infant was born in DEVYN position Via . The head and body were delivered atraumatically and placed on mother's abdomen. The mouth and  nose were suctioned using a bulb. Delayed cord clamping was performed. The placenta was then delivered spontaneously over an intact perineum.  The uterus was massaged and noted to be firm. The vaginal vault inspected. No lacerations noted. Sponge, needle and instrument count correct x 2. Infants Apgar 8-9, 3899g   (7wyo5ga) MBeauvil

## 2019-12-14 NOTE — OB RN DELIVERY SUMMARY - NS_LABORCHARACTER_OBGYN_ALL_OB
Meconium staining/Induction of labor-Medicinal/Antibiotics in labor/Induction of labor-AROM/External electronic FM

## 2019-12-14 NOTE — PROVIDER CONTACT NOTE (OTHER) - ACTION/TREATMENT ORDERED:
Pt assessed by Dr. Dumont. Pt encouraged to drink plenty of fluids, no further intervention at this time. Will continue to monitor.

## 2019-12-14 NOTE — PROVIDER CONTACT NOTE (OTHER) - ASSESSMENT
Pt denies headache, dizziness, lightheadedness, nausea, vomiting, shortness of breath or palpitations.

## 2019-12-14 NOTE — PROGRESS NOTE ADULT - SUBJECTIVE AND OBJECTIVE BOX
S: 24y PPD#0  Patient doing well. Minimal to moderate. lochia. Pain controlled. Voiding. Passing Flatus. Tolerating a regular diet.   denies chest pain, sob or leg pain or tenderness, palpitations or lightheadedness, denies changes in vision or RUQ pain  O: Vital Signs Last 24 Hrs  T(C): 37.1 (14 Dec 2019 10:38), Max: 37.1 (13 Dec 2019 18:14)  T(F): 98.7 (14 Dec 2019 10:38), Max: 98.78 (13 Dec 2019 18:14)  HR: 120 (14 Dec 2019 10:38) (83 - 141)  BP: 140/76 (14 Dec 2019 10:38) (124/63 - 173/79)    RR: 18 (14 Dec 2019 10:38) (16 - 18)  SpO2: 100% (14 Dec 2019 10:38) (76% - 100%)    Gen: NAD  Abd: soft, NT, ND, fundus firm below umbilicus  Lochia: moderate  Ext: no tenderness    Labs:                        9.6    16.60 )-----------( 140      ( 14 Dec 2019 04:48 )             30.5         A: 24y PPD#0 s/p  doing well. Tachycardia to 120, asymptomatic.   Will hydrate po and monitor. Had mild anemia in pregnancy. BP with mild elevation to 140s, asymptomatic, will continue to monitor bp and for ss of pec.  Plan: Increase ambulation. Continue Regular diet. Tylenol and Motrin q6 hrs. Oxycodone prn.

## 2019-12-15 ENCOUNTER — TRANSCRIPTION ENCOUNTER (OUTPATIENT)
Age: 24
End: 2019-12-15

## 2019-12-15 RX ORDER — FERROUS SULFATE 325(65) MG
1 TABLET ORAL
Qty: 60 | Refills: 0
Start: 2019-12-15 | End: 2020-01-13

## 2019-12-15 RX ADMIN — Medication 600 MILLIGRAM(S): at 12:43

## 2019-12-15 RX ADMIN — Medication 1 TABLET(S): at 12:43

## 2019-12-15 RX ADMIN — Medication 975 MILLIGRAM(S): at 20:48

## 2019-12-15 RX ADMIN — Medication 600 MILLIGRAM(S): at 13:20

## 2019-12-15 RX ADMIN — Medication 975 MILLIGRAM(S): at 21:45

## 2019-12-15 NOTE — DISCHARGE NOTE OB - CARE PROVIDER_API CALL
Le Mcdonald (DO)  Obstetrics and Gynecology  [address] [place] [address]  Phone: [phone]  Fax: [phone]  Follow Up Time:

## 2019-12-15 NOTE — DISCHARGE NOTE OB - HOSPITAL COURSE
Patient admitted in labor. Patient underwent vaginal delivery of a viable female. Her hospital course was uncomplicated. Patient was discharged home on postpartum day 2 in good condition. Patient instructed to follow up with obstetrician for postpartum visit in 3 weeks.

## 2019-12-15 NOTE — PROGRESS NOTE ADULT - PROBLEM SELECTOR PLAN 1
- Pain well controlled, continue current pain regimen  - Monitor BPs   - Increase ambulation, SCDs when not ambulating  - Continue regular diet    Sonny Espinal PGY1

## 2019-12-15 NOTE — PROGRESS NOTE ADULT - SUBJECTIVE AND OBJECTIVE BOX
OB Progress Note:  PPD#1    S: 25yo  PPD#1 s/p , pregnancy compclicated by preeclampsia. Patient feels well. Pain is well controlled. She is tolerating a regular diet and passing flatus. She is voiding spontaneously, and ambulating without difficulty. Denies CP/SOB. Denies lightheadedness/dizziness. Denies N/V. Denies headaches, blurry vision, epigastric pain, heavy vaginal bleeding.     O:  Vitals:  Vital Signs Last 24 Hrs  T(C): 36.8 (15 Dec 2019 05:28), Max: 37.1 (14 Dec 2019 10:38)  T(F): 98.2 (15 Dec 2019 05:28), Max: 98.7 (14 Dec 2019 10:38)  HR: 92 (15 Dec 2019 05:28) (85 - 120)  BP: 131/78 (15 Dec 2019 05:28) (131/77 - 146/86)  BP(mean): --  RR: 18 (15 Dec 2019 05:28) (18 - 19)  SpO2: 100% (15 Dec 2019 05:28) (99% - 100%)    MEDICATIONS  (STANDING):  acetaminophen   Tablet .. 975 milliGRAM(s) Oral <User Schedule>  diphtheria/tetanus/pertussis (acellular) Vaccine (ADAcel) 0.5 milliLiter(s) IntraMuscular once  ibuprofen  Tablet. 600 milliGRAM(s) Oral every 6 hours  oxytocin Infusion 333.333 milliUNIT(s)/Min (1000 mL/Hr) IV Continuous <Continuous>  oxytocin Infusion 333.333 milliUNIT(s)/Min (1000 mL/Hr) IV Continuous <Continuous>  prenatal multivitamin 1 Tablet(s) Oral daily  sodium chloride 0.9% lock flush 3 milliLiter(s) IV Push every 8 hours      Labs:  Blood type: O Positive  Rubella IgG: RPR: Negative                          9.6<L>   16.60<H> >-----------< 140<L>    (  @ 04:48 )             30.5<L>                        9.4<L>   10.02 >-----------< 148<L>    (  @ 13:52 )             31.6<L>    19 @ 04:48      136  |  103  |  8   ----------------------------<  88  3.7   |  18<L>  |  0.60    19 @ 13:52          Physical Exam:  General: NAD  Abdomen: soft, non-tender, non-distended, fundus firm  Vaginal: Lochia wnl  Extremities: No erythema/edema

## 2019-12-15 NOTE — PROGRESS NOTE ADULT - ASSESSMENT
23yo PPD#1 s/p , pregnancy complicated by preeclampsia.  Patient is stable and doing well post-partum.

## 2019-12-15 NOTE — DISCHARGE NOTE OB - PATIENT PORTAL LINK FT
You can access the FollowMyHealth Patient Portal offered by [facility] by registering at the following website: [email or web address]. By joining [employer]’s FollowMyHealth portal, you will also be able to view your health information using other applications (apps) compatible with our system.

## 2019-12-15 NOTE — PROGRESS NOTE ADULT - SUBJECTIVE AND OBJECTIVE BOX
POD#1 S/P Vaginal Delivery with epidural anesthesia    Patient is doing well.  OOBAA. Tolerating clears.  Pain is tolerable.  No residual anesthetic issues or complications noted.    Saurav Booth CRNA

## 2019-12-15 NOTE — DISCHARGE NOTE OB - PLAN OF CARE
complete recovery regular diet, activities as tolerated, nothing per vagina, follow up with obstetrician

## 2019-12-15 NOTE — PROGRESS NOTE ADULT - SUBJECTIVE AND OBJECTIVE BOX
S: 24y PPD#1  Patient doing well. Minimal to moderate. lochia. Pain controlled. Voiding. Passing Flatus. Tolerating a regular diet.   Denies chest pain, palpitations, sob, or calf pain.  O: Vital Signs Last 24 Hrs  T(C): 36.8 (15 Dec 2019 05:28), Max: 36.9 (14 Dec 2019 14:06)  T(F): 98.2 (15 Dec 2019 05:28), Max: 98.4 (14 Dec 2019 14:06)  HR: 92 (15 Dec 2019 05:28) (92 - 107)  BP: 131/78 (15 Dec 2019 05:28) (131/77 - 140/74)  RR: 18 (15 Dec 2019 05:28) (18 - 19)  SpO2: 100% (15 Dec 2019 05:28) (99% - 100%)    Gen: NAD  Abd: soft, NT, ND, fundus firm below umbilicus  Lochia: moderate  Ext: no tenderness    Labs:                        9.6    16.60 )-----------( 140      ( 14 Dec 2019 04:48 )             30.5         A: 24y PPD# s/p  doing well. Tachy improved   Plan: Increase ambulation. Continue Regular diet. Tylenol and Motrin q6 hrs. Oxycodone prn. Discharge in am if stable.

## 2019-12-15 NOTE — DISCHARGE NOTE OB - MATERIALS PROVIDED
MMR Vaccination (VIS Pub Date: 2012)/[facility] Hearing Screen Program/Breastfeeding Mother’s Support Group Information/Guide to Postpartum Care/Discharge Medication Information for Patients and Families Pocket Guide/  Immunization Record/Breastfeeding Log/Breastfeeding Guide and Packet/Birth Certificate Instructions/[facility]  Screening Program/Bottle Feeding Log/Shaken Baby Prevention Handout/Vaccinations/Tdap Vaccination (VIS Pub Date: 2012)

## 2019-12-15 NOTE — DISCHARGE NOTE OB - CARE PLAN
Principal Discharge DX:	Vaginal delivery  Goal:	complete recovery  Assessment and plan of treatment:	regular diet, activities as tolerated, nothing per vagina, follow up with obstetrician

## 2019-12-16 VITALS
HEART RATE: 95 BPM | DIASTOLIC BLOOD PRESSURE: 77 MMHG | SYSTOLIC BLOOD PRESSURE: 137 MMHG | TEMPERATURE: 98 F | RESPIRATION RATE: 18 BRPM | OXYGEN SATURATION: 100 %

## 2019-12-16 RX ADMIN — Medication 600 MILLIGRAM(S): at 02:51

## 2019-12-16 RX ADMIN — Medication 600 MILLIGRAM(S): at 11:00

## 2019-12-16 RX ADMIN — Medication 600 MILLIGRAM(S): at 11:55

## 2019-12-16 RX ADMIN — Medication 600 MILLIGRAM(S): at 03:36

## 2019-12-16 RX ADMIN — Medication 1 TABLET(S): at 11:01

## 2019-12-16 NOTE — PROGRESS NOTE ADULT - SUBJECTIVE AND OBJECTIVE BOX
OB Progress Note:  PPD#2    S: 25yo PPD#2 s/p , pregnancy complicated by PEC. Patient feels well. Pain is well controlled. She is tolerating a regular diet and passing flatus. She is voiding spontaneously, and ambulating without difficulty. Denies CP/SOB. Denies lightheadedness/dizziness. Denies N/V. Denies headaches, blurry vision, epigastric pain, excessive swelling, heavy vaginal bleeding.     O:  Vitals:   Vital Signs Last 24 Hrs  T(C): 36.6 (16 Dec 2019 05:56), Max: 36.8 (15 Dec 2019 18:22)  T(F): 97.8 (16 Dec 2019 05:56), Max: 98.2 (15 Dec 2019 18:22)  HR: 95 (16 Dec 2019 05:56) (95 - 96)  BP: 137/77 (16 Dec 2019 05:56) (137/77 - 140/80)  BP(mean): --  RR: 18 (16 Dec 2019 05:56) (17 - 18)  SpO2: 100% (16 Dec 2019 05:56) (100% - 100%)    MEDICATIONS  (STANDING):  acetaminophen   Tablet .. 975 milliGRAM(s) Oral <User Schedule>  diphtheria/tetanus/pertussis (acellular) Vaccine (ADAcel) 0.5 milliLiter(s) IntraMuscular once  ibuprofen  Tablet. 600 milliGRAM(s) Oral every 6 hours  prenatal multivitamin 1 Tablet(s) Oral daily  sodium chloride 0.9% lock flush 3 milliLiter(s) IV Push every 8 hours    MEDICATIONS  (PRN):  benzocaine 20%/menthol 0.5% Spray 1 Spray(s) Topical every 6 hours PRN for Perineal discomfort  dibucaine 1% Ointment 1 Application(s) Topical every 6 hours PRN Perineal discomfort  diphenhydrAMINE 25 milliGRAM(s) Oral every 6 hours PRN Pruritus  glycerin Suppository - Adult 1 Suppository(s) Rectal at bedtime PRN Constipation  hydrocortisone 1% Cream 1 Application(s) Topical every 6 hours PRN Moderate Pain (4-6)  lanolin Ointment 1 Application(s) Topical every 6 hours PRN nipple soreness  magnesium hydroxide Suspension 30 milliLiter(s) Oral two times a day PRN Constipation  oxyCODONE    IR 5 milliGRAM(s) Oral every 3 hours PRN Moderate to Severe Pain (4-10)  oxyCODONE    IR 5 milliGRAM(s) Oral once PRN Moderate to Severe Pain (4-10)  pramoxine 1%/zinc 5% Cream 1 Application(s) Topical every 4 hours PRN Moderate Pain (4-6)  simethicone 80 milliGRAM(s) Chew every 4 hours PRN Gas  witch hazel Pads 1 Application(s) Topical every 4 hours PRN Perineal discomfort      Labs:  Blood type: O Positive  Rubella IgG: RPR: Negative                          9.6<L>   16.60<H> >-----------< 140<L>    (  @ 04:48 )             30.5<L>                        9.4<L>   10.02 >-----------< 148<L>    (  @ 13:52 )             31.6<L>    19 @ 04:48      136  |  103  |  8   ----------------------------<  88  3.7   |  18<L>  |  0.60    19 @ 13:52              Physical Exam:  General: NAD  Abdomen: soft, non-tender, non-distended, fundus firm  Vaginal: Lochia wnl  Extremities: No erythema/edema

## 2019-12-16 NOTE — PROGRESS NOTE ADULT - PROBLEM SELECTOR PLAN 1
- Pain well controlled, continue current pain regimen  - Increase ambulation, SCDs when not ambulating  - Continue regular diet  - Discharge planning     Sonny wahl PGY1

## 2020-01-07 DIAGNOSIS — O99.013 ANEMIA COMPLICATING PREGNANCY, THIRD TRIMESTER: ICD-10-CM

## 2020-01-07 DIAGNOSIS — O09.293 SUPERVISION OF PREGNANCY WITH OTHER POOR REPRODUCTIVE OR OBSTETRIC HISTORY, THIRD TRIMESTER: ICD-10-CM

## 2020-01-07 DIAGNOSIS — Z3A.38 38 WEEKS GESTATION OF PREGNANCY: ICD-10-CM

## 2020-01-07 DIAGNOSIS — O99.213 OBESITY COMPLICATING PREGNANCY, THIRD TRIMESTER: ICD-10-CM

## 2021-04-03 ENCOUNTER — OUTPATIENT (OUTPATIENT)
Dept: OUTPATIENT SERVICES | Facility: HOSPITAL | Age: 26
LOS: 1 days | End: 2021-04-03
Payer: COMMERCIAL

## 2021-04-03 DIAGNOSIS — Z98.890 OTHER SPECIFIED POSTPROCEDURAL STATES: Chronic | ICD-10-CM

## 2021-04-03 DIAGNOSIS — O26.899 OTHER SPECIFIED PREGNANCY RELATED CONDITIONS, UNSPECIFIED TRIMESTER: ICD-10-CM

## 2021-04-03 DIAGNOSIS — Z3A.00 WEEKS OF GESTATION OF PREGNANCY NOT SPECIFIED: ICD-10-CM

## 2021-04-03 LAB
BASOPHILS # BLD AUTO: 0.01 K/UL — SIGNIFICANT CHANGE UP (ref 0–0.2)
BASOPHILS NFR BLD AUTO: 0.2 % — SIGNIFICANT CHANGE UP (ref 0–2)
EOSINOPHIL # BLD AUTO: 0 K/UL — SIGNIFICANT CHANGE UP (ref 0–0.5)
EOSINOPHIL NFR BLD AUTO: 0 % — SIGNIFICANT CHANGE UP (ref 0–6)
HCT VFR BLD CALC: 25.7 % — LOW (ref 34.5–45)
HGB BLD-MCNC: 7.8 G/DL — LOW (ref 11.5–15.5)
IMM GRANULOCYTES NFR BLD AUTO: 1 % — SIGNIFICANT CHANGE UP (ref 0–1.5)
LYMPHOCYTES # BLD AUTO: 0.69 K/UL — LOW (ref 1–3.3)
LYMPHOCYTES # BLD AUTO: 13.4 % — SIGNIFICANT CHANGE UP (ref 13–44)
MCHC RBC-ENTMCNC: 22.6 PG — LOW (ref 27–34)
MCHC RBC-ENTMCNC: 30.4 GM/DL — LOW (ref 32–36)
MCV RBC AUTO: 74.5 FL — LOW (ref 80–100)
MONOCYTES # BLD AUTO: 0.82 K/UL — SIGNIFICANT CHANGE UP (ref 0–0.9)
MONOCYTES NFR BLD AUTO: 16 % — HIGH (ref 2–14)
NEUTROPHILS # BLD AUTO: 3.57 K/UL — SIGNIFICANT CHANGE UP (ref 1.8–7.4)
NEUTROPHILS NFR BLD AUTO: 69.4 % — SIGNIFICANT CHANGE UP (ref 43–77)
NRBC # BLD: 0 /100 WBCS — SIGNIFICANT CHANGE UP (ref 0–0)
PLATELET # BLD AUTO: 114 K/UL — LOW (ref 150–400)
RBC # BLD: 3.45 M/UL — LOW (ref 3.8–5.2)
RBC # FLD: 16.5 % — HIGH (ref 10.3–14.5)
WBC # BLD: 5.14 K/UL — SIGNIFICANT CHANGE UP (ref 3.8–10.5)
WBC # FLD AUTO: 5.14 K/UL — SIGNIFICANT CHANGE UP (ref 3.8–10.5)

## 2021-04-03 PROCEDURE — 99284 EMERGENCY DEPT VISIT MOD MDM: CPT

## 2021-04-03 PROCEDURE — 85025 COMPLETE CBC W/AUTO DIFF WBC: CPT

## 2021-04-03 PROCEDURE — 59025 FETAL NON-STRESS TEST: CPT

## 2021-04-03 PROCEDURE — G0463: CPT

## 2021-04-03 PROCEDURE — 76815 OB US LIMITED FETUS(S): CPT | Mod: 26

## 2021-04-03 PROCEDURE — 76815 OB US LIMITED FETUS(S): CPT

## 2021-04-03 PROCEDURE — 36415 COLL VENOUS BLD VENIPUNCTURE: CPT

## 2021-04-03 RX ORDER — ACETAMINOPHEN 500 MG
650 TABLET ORAL ONCE
Refills: 0 | Status: COMPLETED | OUTPATIENT
Start: 2021-04-03 | End: 2021-04-03

## 2021-04-03 RX ORDER — IRON SUCROSE 20 MG/ML
200 INJECTION, SOLUTION INTRAVENOUS ONCE
Refills: 0 | Status: COMPLETED | OUTPATIENT
Start: 2021-04-03 | End: 2021-04-03

## 2021-04-03 RX ADMIN — Medication 650 MILLIGRAM(S): at 13:15

## 2021-04-03 RX ADMIN — IRON SUCROSE 110 MILLIGRAM(S): 20 INJECTION, SOLUTION INTRAVENOUS at 14:30

## 2021-04-03 RX ADMIN — Medication 650 MILLIGRAM(S): at 12:26

## 2021-05-05 ENCOUNTER — APPOINTMENT (OUTPATIENT)
Dept: ANTEPARTUM | Facility: CLINIC | Age: 26
End: 2021-05-05

## 2021-05-14 ENCOUNTER — APPOINTMENT (OUTPATIENT)
Dept: ANTEPARTUM | Facility: CLINIC | Age: 26
End: 2021-05-14
Payer: COMMERCIAL

## 2021-05-14 ENCOUNTER — ASOB RESULT (OUTPATIENT)
Age: 26
End: 2021-05-14

## 2021-05-14 PROCEDURE — 76819 FETAL BIOPHYS PROFIL W/O NST: CPT

## 2021-05-14 PROCEDURE — 99241 OFFICE CONSULTATION NEW/ESTAB PATIENT 15 MIN: CPT | Mod: 25

## 2021-05-14 PROCEDURE — 76811 OB US DETAILED SNGL FETUS: CPT

## 2021-05-18 ENCOUNTER — NON-APPOINTMENT (OUTPATIENT)
Age: 26
End: 2021-05-18

## 2021-05-18 ENCOUNTER — ASOB RESULT (OUTPATIENT)
Age: 26
End: 2021-05-18

## 2021-05-18 ENCOUNTER — APPOINTMENT (OUTPATIENT)
Dept: ANTEPARTUM | Facility: CLINIC | Age: 26
End: 2021-05-18
Payer: COMMERCIAL

## 2021-05-18 PROCEDURE — 99203 OFFICE O/P NEW LOW 30 MIN: CPT | Mod: 95

## 2021-05-25 ENCOUNTER — NON-APPOINTMENT (OUTPATIENT)
Age: 26
End: 2021-05-25

## 2021-05-25 ENCOUNTER — OUTPATIENT (OUTPATIENT)
Dept: OUTPATIENT SERVICES | Facility: HOSPITAL | Age: 26
LOS: 1 days | Discharge: HOME | End: 2021-05-25

## 2021-05-25 DIAGNOSIS — Z98.890 OTHER SPECIFIED POSTPROCEDURAL STATES: Chronic | ICD-10-CM

## 2021-05-25 DIAGNOSIS — D64.9 ANEMIA, UNSPECIFIED: ICD-10-CM

## 2021-05-26 ENCOUNTER — LABORATORY RESULT (OUTPATIENT)
Age: 26
End: 2021-05-26

## 2021-05-26 ENCOUNTER — RESULT REVIEW (OUTPATIENT)
Age: 26
End: 2021-05-26

## 2021-05-26 ENCOUNTER — APPOINTMENT (OUTPATIENT)
Dept: HEMATOLOGY ONCOLOGY | Facility: CLINIC | Age: 26
End: 2021-05-26
Payer: COMMERCIAL

## 2021-05-26 VITALS
WEIGHT: 195.11 LBS | OXYGEN SATURATION: 99 % | TEMPERATURE: 97.4 F | HEART RATE: 95 BPM | RESPIRATION RATE: 15 BRPM | HEIGHT: 63 IN | SYSTOLIC BLOOD PRESSURE: 112 MMHG | BODY MASS INDEX: 34.57 KG/M2 | DIASTOLIC BLOOD PRESSURE: 73 MMHG

## 2021-05-26 DIAGNOSIS — E61.1 IRON DEFICIENCY: ICD-10-CM

## 2021-05-26 LAB
BASOPHILS # BLD AUTO: 0.02 K/UL — SIGNIFICANT CHANGE UP (ref 0–0.2)
BASOPHILS NFR BLD AUTO: 0.2 % — SIGNIFICANT CHANGE UP (ref 0–2)
EOSINOPHIL # BLD AUTO: 0.11 K/UL — SIGNIFICANT CHANGE UP (ref 0–0.5)
EOSINOPHIL NFR BLD AUTO: 1.2 % — SIGNIFICANT CHANGE UP (ref 0–6)
HCT VFR BLD CALC: 30.2 % — LOW (ref 34.5–45)
HGB BLD-MCNC: 9.1 G/DL — LOW (ref 11.5–15.5)
IMM GRANULOCYTES NFR BLD AUTO: 0.8 % — SIGNIFICANT CHANGE UP (ref 0–1.5)
LYMPHOCYTES # BLD AUTO: 2.23 K/UL — SIGNIFICANT CHANGE UP (ref 1–3.3)
LYMPHOCYTES # BLD AUTO: 23.6 % — SIGNIFICANT CHANGE UP (ref 13–44)
MCHC RBC-ENTMCNC: 23.1 PG — LOW (ref 27–34)
MCHC RBC-ENTMCNC: 30.1 G/DL — LOW (ref 32–36)
MCV RBC AUTO: 76.6 FL — LOW (ref 80–100)
MONOCYTES # BLD AUTO: 0.63 K/UL — SIGNIFICANT CHANGE UP (ref 0–0.9)
MONOCYTES NFR BLD AUTO: 6.7 % — SIGNIFICANT CHANGE UP (ref 2–14)
NEUTROPHILS # BLD AUTO: 6.38 K/UL — SIGNIFICANT CHANGE UP (ref 1.8–7.4)
NEUTROPHILS NFR BLD AUTO: 67.5 % — SIGNIFICANT CHANGE UP (ref 43–77)
NRBC # BLD: 0 /100 WBCS — SIGNIFICANT CHANGE UP (ref 0–0)
PLATELET # BLD AUTO: 152 K/UL — SIGNIFICANT CHANGE UP (ref 150–400)
RBC # BLD: 3.94 M/UL — SIGNIFICANT CHANGE UP (ref 3.8–5.2)
RBC # FLD: 17.8 % — HIGH (ref 10.3–14.5)
WBC # BLD: 9.45 K/UL — SIGNIFICANT CHANGE UP (ref 3.8–10.5)
WBC # FLD AUTO: 9.45 K/UL — SIGNIFICANT CHANGE UP (ref 3.8–10.5)

## 2021-05-26 PROCEDURE — 99204 OFFICE O/P NEW MOD 45 MIN: CPT

## 2021-05-26 PROCEDURE — 99072 ADDL SUPL MATRL&STAF TM PHE: CPT

## 2021-05-27 LAB
RETICS #: 76.4 K/UL — SIGNIFICANT CHANGE UP (ref 25–125)
RETICS/RBC NFR: 1.9 % — SIGNIFICANT CHANGE UP (ref 0.5–2.5)

## 2021-05-28 ENCOUNTER — APPOINTMENT (OUTPATIENT)
Dept: ANTEPARTUM | Facility: CLINIC | Age: 26
End: 2021-05-28

## 2021-05-28 ENCOUNTER — OUTPATIENT (OUTPATIENT)
Dept: OUTPATIENT SERVICES | Facility: HOSPITAL | Age: 26
LOS: 1 days | End: 2021-05-28

## 2021-05-28 ENCOUNTER — ASOB RESULT (OUTPATIENT)
Age: 26
End: 2021-05-28

## 2021-05-28 ENCOUNTER — APPOINTMENT (OUTPATIENT)
Dept: ANTEPARTUM | Facility: CLINIC | Age: 26
End: 2021-05-28
Payer: COMMERCIAL

## 2021-05-28 DIAGNOSIS — Z98.890 OTHER SPECIFIED POSTPROCEDURAL STATES: Chronic | ICD-10-CM

## 2021-05-28 PROCEDURE — 76818 FETAL BIOPHYS PROFILE W/NST: CPT | Mod: 26

## 2021-05-30 NOTE — HISTORY OF PRESENT ILLNESS
[de-identified] : This is a 25 year old woman A2 TOP X1 stillborn , patient currently 36 weeks gestation.  She presents for management of an iron deficiency anemia in [facility] setting pof pregnancy.  Hg 7.8g/dl on 4/3/21\par Recieved several IV iron treatments with prior children.\par Between the pregnancies, patient was still anemic.  Hg was never as low as it was now.  Started iron tablets in February\par 36 weeks pregnant now.  Ferritin 7 in 2019.  Had iron infusions with both of her previous children\par \par  had COVID.

## 2021-05-30 NOTE — PHYSICAL EXAM
[Normal] : normal appearance, no rash, nodules, vesicles, ulcers, erythema [de-identified] : Gravid uterus consistant with 36 week pregnancy.

## 2021-05-30 NOTE — ASSESSMENT
[FreeTextEntry1] : 25 year old A2 36 weeks gestation. Patient presents for management of an iron deficiency. Hg 7.8g/dl . Given the little time we have left before induction at 38 weeks, will need to quickly replete iron. Will schedule IV Venofer 3 times a week for goal repletion of 1200mg Iron deficient, over 6 divided doses of IV Venofer.

## 2021-06-02 LAB
ALBUMIN SERPL ELPH-MCNC: 3.9 G/DL
ALP BLD-CCNC: 150 U/L
ALT SERPL-CCNC: 9 U/L
ANION GAP SERPL CALC-SCNC: 11 MMOL/L
AST SERPL-CCNC: 13 U/L
BILIRUB SERPL-MCNC: 0.2 MG/DL
BUN SERPL-MCNC: 8 MG/DL
CALCIUM SERPL-MCNC: 9.1 MG/DL
CHLORIDE SERPL-SCNC: 103 MMOL/L
CO2 SERPL-SCNC: 21 MMOL/L
CREAT SERPL-MCNC: 0.52 MG/DL
FERRITIN SERPL-MCNC: 6 NG/ML
FOLATE SERPL-MCNC: 12.6 NG/ML
GLUCOSE SERPL-MCNC: 70 MG/DL
IRON SATN MFR SERPL: 7 %
IRON SERPL-MCNC: 41 UG/DL
POTASSIUM SERPL-SCNC: 3.4 MMOL/L
PROT SERPL-MCNC: 6.9 G/DL
SODIUM SERPL-SCNC: 135 MMOL/L
TIBC SERPL-MCNC: 613 UG/DL
UIBC SERPL-MCNC: 572 UG/DL
VIT B12 SERPL-MCNC: 333 PG/ML

## 2021-06-04 ENCOUNTER — ASOB RESULT (OUTPATIENT)
Age: 26
End: 2021-06-04

## 2021-06-04 ENCOUNTER — APPOINTMENT (OUTPATIENT)
Dept: ANTEPARTUM | Facility: HOSPITAL | Age: 26
End: 2021-06-04

## 2021-06-04 ENCOUNTER — APPOINTMENT (OUTPATIENT)
Dept: ANTEPARTUM | Facility: CLINIC | Age: 26
End: 2021-06-04
Payer: COMMERCIAL

## 2021-06-04 ENCOUNTER — OUTPATIENT (OUTPATIENT)
Dept: OUTPATIENT SERVICES | Facility: HOSPITAL | Age: 26
LOS: 1 days | End: 2021-06-04

## 2021-06-04 DIAGNOSIS — Z98.890 OTHER SPECIFIED POSTPROCEDURAL STATES: Chronic | ICD-10-CM

## 2021-06-04 PROCEDURE — 76818 FETAL BIOPHYS PROFILE W/NST: CPT | Mod: 26

## 2021-06-07 ENCOUNTER — APPOINTMENT (OUTPATIENT)
Dept: INFUSION THERAPY | Facility: HOSPITAL | Age: 26
End: 2021-06-07

## 2021-06-08 DIAGNOSIS — D50.9 IRON DEFICIENCY ANEMIA, UNSPECIFIED: ICD-10-CM

## 2021-06-09 ENCOUNTER — APPOINTMENT (OUTPATIENT)
Dept: INFUSION THERAPY | Facility: HOSPITAL | Age: 26
End: 2021-06-09

## 2021-06-10 DIAGNOSIS — Z51.11 ENCOUNTER FOR ANTINEOPLASTIC CHEMOTHERAPY: ICD-10-CM

## 2021-06-10 DIAGNOSIS — R11.2 NAUSEA WITH VOMITING, UNSPECIFIED: ICD-10-CM

## 2021-06-11 ENCOUNTER — RESULT REVIEW (OUTPATIENT)
Age: 26
End: 2021-06-11

## 2021-06-11 ENCOUNTER — APPOINTMENT (OUTPATIENT)
Dept: ANTEPARTUM | Facility: CLINIC | Age: 26
End: 2021-06-11
Payer: COMMERCIAL

## 2021-06-11 ENCOUNTER — APPOINTMENT (OUTPATIENT)
Dept: INFUSION THERAPY | Facility: HOSPITAL | Age: 26
End: 2021-06-11

## 2021-06-11 ENCOUNTER — LABORATORY RESULT (OUTPATIENT)
Age: 26
End: 2021-06-11

## 2021-06-11 ENCOUNTER — ASOB RESULT (OUTPATIENT)
Age: 26
End: 2021-06-11

## 2021-06-11 LAB
BASOPHILS # BLD AUTO: 0.03 K/UL — SIGNIFICANT CHANGE UP (ref 0–0.2)
BASOPHILS NFR BLD AUTO: 0.3 % — SIGNIFICANT CHANGE UP (ref 0–2)
EOSINOPHIL # BLD AUTO: 0.06 K/UL — SIGNIFICANT CHANGE UP (ref 0–0.5)
EOSINOPHIL NFR BLD AUTO: 0.7 % — SIGNIFICANT CHANGE UP (ref 0–6)
HCT VFR BLD CALC: 29 % — LOW (ref 34.5–45)
HGB BLD-MCNC: 9 G/DL — LOW (ref 11.5–15.5)
IMM GRANULOCYTES NFR BLD AUTO: 1.2 % — SIGNIFICANT CHANGE UP (ref 0–1.5)
LYMPHOCYTES # BLD AUTO: 2.04 K/UL — SIGNIFICANT CHANGE UP (ref 1–3.3)
LYMPHOCYTES # BLD AUTO: 22.3 % — SIGNIFICANT CHANGE UP (ref 13–44)
MCHC RBC-ENTMCNC: 23.5 PG — LOW (ref 27–34)
MCHC RBC-ENTMCNC: 31 G/DL — LOW (ref 32–36)
MCV RBC AUTO: 75.7 FL — LOW (ref 80–100)
MONOCYTES # BLD AUTO: 0.37 K/UL — SIGNIFICANT CHANGE UP (ref 0–0.9)
MONOCYTES NFR BLD AUTO: 4 % — SIGNIFICANT CHANGE UP (ref 2–14)
NEUTROPHILS # BLD AUTO: 6.54 K/UL — SIGNIFICANT CHANGE UP (ref 1.8–7.4)
NEUTROPHILS NFR BLD AUTO: 71.5 % — SIGNIFICANT CHANGE UP (ref 43–77)
NRBC # BLD: 0 /100 WBCS — SIGNIFICANT CHANGE UP (ref 0–0)
PLATELET # BLD AUTO: 134 K/UL — LOW (ref 150–400)
RBC # BLD: 3.83 M/UL — SIGNIFICANT CHANGE UP (ref 3.8–5.2)
RBC # FLD: 18.6 % — HIGH (ref 10.3–14.5)
WBC # BLD: 9.15 K/UL — SIGNIFICANT CHANGE UP (ref 3.8–10.5)
WBC # FLD AUTO: 9.15 K/UL — SIGNIFICANT CHANGE UP (ref 3.8–10.5)

## 2021-06-11 PROCEDURE — 99072 ADDL SUPL MATRL&STAF TM PHE: CPT

## 2021-06-11 PROCEDURE — 76819 FETAL BIOPHYS PROFIL W/O NST: CPT

## 2021-06-11 PROCEDURE — 76816 OB US FOLLOW-UP PER FETUS: CPT

## 2021-06-14 ENCOUNTER — APPOINTMENT (OUTPATIENT)
Dept: INFUSION THERAPY | Facility: HOSPITAL | Age: 26
End: 2021-06-14

## 2021-06-15 ENCOUNTER — TRANSCRIPTION ENCOUNTER (OUTPATIENT)
Age: 26
End: 2021-06-15

## 2021-06-15 ENCOUNTER — INPATIENT (INPATIENT)
Facility: HOSPITAL | Age: 26
LOS: 1 days | Discharge: HOME | End: 2021-06-17
Attending: OBSTETRICS & GYNECOLOGY | Admitting: OBSTETRICS & GYNECOLOGY
Payer: COMMERCIAL

## 2021-06-15 ENCOUNTER — RESULT REVIEW (OUTPATIENT)
Age: 26
End: 2021-06-15

## 2021-06-15 VITALS — DIASTOLIC BLOOD PRESSURE: 72 MMHG | HEART RATE: 115 BPM | SYSTOLIC BLOOD PRESSURE: 120 MMHG

## 2021-06-15 DIAGNOSIS — Z98.890 OTHER SPECIFIED POSTPROCEDURAL STATES: Chronic | ICD-10-CM

## 2021-06-15 DIAGNOSIS — O36.4XX0 MATERNAL CARE FOR INTRAUTERINE DEATH, NOT APPLICABLE OR UNSPECIFIED: Chronic | ICD-10-CM

## 2021-06-15 LAB
ALBUMIN SERPL ELPH-MCNC: 3.4 G/DL — SIGNIFICANT CHANGE UP (ref 3.3–5)
ALBUMIN SERPL ELPH-MCNC: 3.8 G/DL — SIGNIFICANT CHANGE UP (ref 3.3–5)
ALP SERPL-CCNC: 147 U/L — HIGH (ref 40–120)
ALP SERPL-CCNC: 154 U/L — HIGH (ref 40–120)
ALT FLD-CCNC: 10 U/L — SIGNIFICANT CHANGE UP (ref 4–33)
ALT FLD-CCNC: 8 U/L — SIGNIFICANT CHANGE UP (ref 4–33)
ANION GAP SERPL CALC-SCNC: 11 MMOL/L — SIGNIFICANT CHANGE UP (ref 7–14)
ANION GAP SERPL CALC-SCNC: 14 MMOL/L — SIGNIFICANT CHANGE UP (ref 7–14)
APPEARANCE UR: ABNORMAL
APTT BLD: 28.9 SEC — SIGNIFICANT CHANGE UP (ref 27–36.3)
APTT BLD: 30.2 SEC — SIGNIFICANT CHANGE UP (ref 27–36.3)
AST SERPL-CCNC: 15 U/L — SIGNIFICANT CHANGE UP (ref 4–32)
AST SERPL-CCNC: 16 U/L — SIGNIFICANT CHANGE UP (ref 4–32)
BACTERIA # UR AUTO: ABNORMAL
BASOPHILS # BLD AUTO: 0.02 K/UL — SIGNIFICANT CHANGE UP (ref 0–0.2)
BASOPHILS # BLD AUTO: 0.03 K/UL — SIGNIFICANT CHANGE UP (ref 0–0.2)
BASOPHILS NFR BLD AUTO: 0.2 % — SIGNIFICANT CHANGE UP (ref 0–2)
BASOPHILS NFR BLD AUTO: 0.3 % — SIGNIFICANT CHANGE UP (ref 0–2)
BILIRUB SERPL-MCNC: 0.2 MG/DL — SIGNIFICANT CHANGE UP (ref 0.2–1.2)
BILIRUB SERPL-MCNC: 0.3 MG/DL — SIGNIFICANT CHANGE UP (ref 0.2–1.2)
BILIRUB UR-MCNC: NEGATIVE — SIGNIFICANT CHANGE UP
BLD GP AB SCN SERPL QL: NEGATIVE — SIGNIFICANT CHANGE UP
BUN SERPL-MCNC: 7 MG/DL — SIGNIFICANT CHANGE UP (ref 7–23)
BUN SERPL-MCNC: 8 MG/DL — SIGNIFICANT CHANGE UP (ref 7–23)
CALCIUM SERPL-MCNC: 8.8 MG/DL — SIGNIFICANT CHANGE UP (ref 8.4–10.5)
CALCIUM SERPL-MCNC: 9.7 MG/DL — SIGNIFICANT CHANGE UP (ref 8.4–10.5)
CHLORIDE SERPL-SCNC: 104 MMOL/L — SIGNIFICANT CHANGE UP (ref 98–107)
CHLORIDE SERPL-SCNC: 106 MMOL/L — SIGNIFICANT CHANGE UP (ref 98–107)
CO2 SERPL-SCNC: 19 MMOL/L — LOW (ref 22–31)
CO2 SERPL-SCNC: 19 MMOL/L — LOW (ref 22–31)
COLOR SPEC: YELLOW — SIGNIFICANT CHANGE UP
COVID-19 SPIKE DOMAIN AB INTERP: POSITIVE
COVID-19 SPIKE DOMAIN ANTIBODY RESULT: 217 U/ML — HIGH
CREAT ?TM UR-MCNC: 136 MG/DL — SIGNIFICANT CHANGE UP
CREAT SERPL-MCNC: 0.53 MG/DL — SIGNIFICANT CHANGE UP (ref 0.5–1.3)
CREAT SERPL-MCNC: 0.58 MG/DL — SIGNIFICANT CHANGE UP (ref 0.5–1.3)
DIFF PNL FLD: NEGATIVE — SIGNIFICANT CHANGE UP
EOSINOPHIL # BLD AUTO: 0.07 K/UL — SIGNIFICANT CHANGE UP (ref 0–0.5)
EOSINOPHIL # BLD AUTO: 0.07 K/UL — SIGNIFICANT CHANGE UP (ref 0–0.5)
EOSINOPHIL NFR BLD AUTO: 0.7 % — SIGNIFICANT CHANGE UP (ref 0–6)
EOSINOPHIL NFR BLD AUTO: 0.7 % — SIGNIFICANT CHANGE UP (ref 0–6)
EPI CELLS # UR: 12 /HPF — HIGH (ref 0–5)
FIBRINOGEN PPP-MCNC: 597 MG/DL — HIGH (ref 290–520)
FIBRINOGEN PPP-MCNC: 681 MG/DL — HIGH (ref 290–520)
GLUCOSE SERPL-MCNC: 119 MG/DL — HIGH (ref 70–99)
GLUCOSE SERPL-MCNC: 79 MG/DL — SIGNIFICANT CHANGE UP (ref 70–99)
GLUCOSE UR QL: ABNORMAL
HCT VFR BLD CALC: 30.3 % — LOW (ref 34.5–45)
HCT VFR BLD CALC: 32.6 % — LOW (ref 34.5–45)
HGB BLD-MCNC: 9 G/DL — LOW (ref 11.5–15.5)
HGB BLD-MCNC: 9.5 G/DL — LOW (ref 11.5–15.5)
HYALINE CASTS # UR AUTO: 1 /LPF — SIGNIFICANT CHANGE UP (ref 0–7)
IANC: 7.16 K/UL — SIGNIFICANT CHANGE UP (ref 1.5–8.5)
IANC: 7.22 K/UL — SIGNIFICANT CHANGE UP (ref 1.5–8.5)
IMM GRANULOCYTES NFR BLD AUTO: 1.6 % — HIGH (ref 0–1.5)
IMM GRANULOCYTES NFR BLD AUTO: 1.9 % — HIGH (ref 0–1.5)
INR BLD: 1 RATIO — SIGNIFICANT CHANGE UP (ref 0.88–1.16)
INR BLD: 1 RATIO — SIGNIFICANT CHANGE UP (ref 0.88–1.16)
KETONES UR-MCNC: NEGATIVE — SIGNIFICANT CHANGE UP
LDH SERPL L TO P-CCNC: 147 U/L — SIGNIFICANT CHANGE UP (ref 135–225)
LDH SERPL L TO P-CCNC: 177 U/L — SIGNIFICANT CHANGE UP (ref 135–225)
LEUKOCYTE ESTERASE UR-ACNC: ABNORMAL
LYMPHOCYTES # BLD AUTO: 1.95 K/UL — SIGNIFICANT CHANGE UP (ref 1–3.3)
LYMPHOCYTES # BLD AUTO: 1.97 K/UL — SIGNIFICANT CHANGE UP (ref 1–3.3)
LYMPHOCYTES # BLD AUTO: 19.5 % — SIGNIFICANT CHANGE UP (ref 13–44)
LYMPHOCYTES # BLD AUTO: 19.7 % — SIGNIFICANT CHANGE UP (ref 13–44)
MCHC RBC-ENTMCNC: 23.1 PG — LOW (ref 27–34)
MCHC RBC-ENTMCNC: 23.4 PG — LOW (ref 27–34)
MCHC RBC-ENTMCNC: 29.1 GM/DL — LOW (ref 32–36)
MCHC RBC-ENTMCNC: 29.7 GM/DL — LOW (ref 32–36)
MCV RBC AUTO: 78.7 FL — LOW (ref 80–100)
MCV RBC AUTO: 79.3 FL — LOW (ref 80–100)
MONOCYTES # BLD AUTO: 0.56 K/UL — SIGNIFICANT CHANGE UP (ref 0–0.9)
MONOCYTES # BLD AUTO: 0.59 K/UL — SIGNIFICANT CHANGE UP (ref 0–0.9)
MONOCYTES NFR BLD AUTO: 5.6 % — SIGNIFICANT CHANGE UP (ref 2–14)
MONOCYTES NFR BLD AUTO: 5.9 % — SIGNIFICANT CHANGE UP (ref 2–14)
NEUTROPHILS # BLD AUTO: 7.16 K/UL — SIGNIFICANT CHANGE UP (ref 1.8–7.4)
NEUTROPHILS # BLD AUTO: 7.22 K/UL — SIGNIFICANT CHANGE UP (ref 1.8–7.4)
NEUTROPHILS NFR BLD AUTO: 71.6 % — SIGNIFICANT CHANGE UP (ref 43–77)
NEUTROPHILS NFR BLD AUTO: 72.3 % — SIGNIFICANT CHANGE UP (ref 43–77)
NITRITE UR-MCNC: NEGATIVE — SIGNIFICANT CHANGE UP
NRBC # BLD: 0 /100 WBCS — SIGNIFICANT CHANGE UP
NRBC # BLD: 0 /100 WBCS — SIGNIFICANT CHANGE UP
NRBC # FLD: 0.02 K/UL — HIGH
NRBC # FLD: 0.02 K/UL — HIGH
PH UR: 7 — SIGNIFICANT CHANGE UP (ref 5–8)
PLATELET # BLD AUTO: 117 K/UL — LOW (ref 150–400)
PLATELET # BLD AUTO: 128 K/UL — LOW (ref 150–400)
POTASSIUM SERPL-MCNC: 3.5 MMOL/L — SIGNIFICANT CHANGE UP (ref 3.5–5.3)
POTASSIUM SERPL-MCNC: 3.8 MMOL/L — SIGNIFICANT CHANGE UP (ref 3.5–5.3)
POTASSIUM SERPL-SCNC: 3.5 MMOL/L — SIGNIFICANT CHANGE UP (ref 3.5–5.3)
POTASSIUM SERPL-SCNC: 3.8 MMOL/L — SIGNIFICANT CHANGE UP (ref 3.5–5.3)
PROT ?TM UR-MCNC: 46 MG/DL — SIGNIFICANT CHANGE UP
PROT ?TM UR-MCNC: 46 MG/DL — SIGNIFICANT CHANGE UP
PROT SERPL-MCNC: 6.6 G/DL — SIGNIFICANT CHANGE UP (ref 6–8.3)
PROT SERPL-MCNC: 7 G/DL — SIGNIFICANT CHANGE UP (ref 6–8.3)
PROT UR-MCNC: ABNORMAL
PROT/CREAT UR-RTO: 0.3 RATIO — HIGH (ref 0–0.2)
PROTHROM AB SERPL-ACNC: 11.4 SEC — SIGNIFICANT CHANGE UP (ref 10.6–13.6)
PROTHROM AB SERPL-ACNC: 11.5 SEC — SIGNIFICANT CHANGE UP (ref 10.6–13.6)
RBC # BLD: 3.85 M/UL — SIGNIFICANT CHANGE UP (ref 3.8–5.2)
RBC # BLD: 4.11 M/UL — SIGNIFICANT CHANGE UP (ref 3.8–5.2)
RBC # FLD: 20.9 % — HIGH (ref 10.3–14.5)
RBC # FLD: 21.1 % — HIGH (ref 10.3–14.5)
RBC CASTS # UR COMP ASSIST: 4 /HPF — SIGNIFICANT CHANGE UP (ref 0–4)
RH IG SCN BLD-IMP: POSITIVE — SIGNIFICANT CHANGE UP
SARS-COV-2 IGG+IGM SERPL QL IA: 217 U/ML — HIGH
SARS-COV-2 IGG+IGM SERPL QL IA: POSITIVE
SARS-COV-2 RNA SPEC QL NAA+PROBE: SIGNIFICANT CHANGE UP
SODIUM SERPL-SCNC: 136 MMOL/L — SIGNIFICANT CHANGE UP (ref 135–145)
SODIUM SERPL-SCNC: 137 MMOL/L — SIGNIFICANT CHANGE UP (ref 135–145)
SP GR SPEC: 1.03 — HIGH (ref 1.01–1.02)
URATE SERPL-MCNC: 2.4 MG/DL — LOW (ref 2.5–7)
URATE SERPL-MCNC: 2.6 MG/DL — SIGNIFICANT CHANGE UP (ref 2.5–7)
UROBILINOGEN FLD QL: SIGNIFICANT CHANGE UP
WBC # BLD: 10 K/UL — SIGNIFICANT CHANGE UP (ref 3.8–10.5)
WBC # BLD: 9.99 K/UL — SIGNIFICANT CHANGE UP (ref 3.8–10.5)
WBC # FLD AUTO: 10 K/UL — SIGNIFICANT CHANGE UP (ref 3.8–10.5)
WBC # FLD AUTO: 9.99 K/UL — SIGNIFICANT CHANGE UP (ref 3.8–10.5)
WBC UR QL: 16 /HPF — HIGH (ref 0–5)

## 2021-06-15 RX ORDER — CITRIC ACID/SODIUM CITRATE 300-500 MG
15 SOLUTION, ORAL ORAL EVERY 6 HOURS
Refills: 0 | Status: DISCONTINUED | OUTPATIENT
Start: 2021-06-15 | End: 2021-06-16

## 2021-06-15 RX ORDER — AMPICILLIN TRIHYDRATE 250 MG
1 CAPSULE ORAL EVERY 4 HOURS
Refills: 0 | Status: DISCONTINUED | OUTPATIENT
Start: 2021-06-15 | End: 2021-06-16

## 2021-06-15 RX ORDER — SODIUM CHLORIDE 9 MG/ML
1000 INJECTION, SOLUTION INTRAVENOUS
Refills: 0 | Status: DISCONTINUED | OUTPATIENT
Start: 2021-06-15 | End: 2021-06-16

## 2021-06-15 RX ORDER — OXYTOCIN 10 UNIT/ML
333.33 VIAL (ML) INJECTION
Qty: 20 | Refills: 0 | Status: DISCONTINUED | OUTPATIENT
Start: 2021-06-15 | End: 2021-06-16

## 2021-06-15 RX ORDER — AMPICILLIN TRIHYDRATE 250 MG
2 CAPSULE ORAL ONCE
Refills: 0 | Status: COMPLETED | OUTPATIENT
Start: 2021-06-15 | End: 2021-06-15

## 2021-06-15 RX ADMIN — Medication 108 GRAM(S): at 22:11

## 2021-06-15 RX ADMIN — Medication 216 GRAM(S): at 14:15

## 2021-06-15 RX ADMIN — SODIUM CHLORIDE 125 MILLILITER(S): 9 INJECTION, SOLUTION INTRAVENOUS at 14:22

## 2021-06-15 RX ADMIN — Medication 108 GRAM(S): at 18:15

## 2021-06-15 NOTE — CHART NOTE - NSCHARTNOTEFT_GEN_A_CORE
Attending Note    Patient evaluated at bedside. Patient is resting comfortably after epidural placement. Denies headache, visual changes, epigastric pain, shortness of breath, chest pain, etc  VS T 36.7  P 106  /84  R 16  O2 sat 100% RA    Heent nl  abd gravid, toco ctx q 2-3min   reactive category I   VE 4/70/-3    A: 24 yo P2 with IUP 39 wks ga, poor OB history, PEC,GBS+  P: AROM performed for augmentation as patient is jaelyn too frequently to continue with oral cytotec or start pitocin. amount of clear fluid noted  Continue antibiotics  re-assess in 2hrs  Anticipate   MBeauvil

## 2021-06-15 NOTE — CHART NOTE - NSCHARTNOTEFT_GEN_A_CORE
Attending Note    Patient c/o painful contractions and is requesting an epidural.  Denies leakage of fluid, vaginal bleeding. Reports good fetal movements. Denies headache, visual changes, epigastric pain, shortness of breath, etc  VS T 98.6  p 94  R 17  /86    Heent nl  abd gravid, EFW 8.5-9lbs by leopolds  [place] ctx q 2min  's reactive moderate variability category I  VE 2/50/-3 vtx  labs: H/H 9/30.3  WBC 10 plts 128K     A: 26 yo P2 with IUP 39 0/7wks, poor OB history, mild preeclampsia, GBS+  P: Anesthesia notified for epidural placement     continue antibiotics     Anticipate      MBeauvil

## 2021-06-15 NOTE — OB RN PATIENT PROFILE - NSMATERNALFETALCONCERNS_OBGYN_ALL_OB_FT
MATERNAL ALERT  History of IUFD ( 2015) and anemia   Hematology consult Jimi 5/26/2021 secondary to iron deficiency anemia - see gwen   MFM consult 5/18/2021 -see brit  MFM suggests 1-2 units RBC upon admission if hemoglobin less or = 7   delivery @ 39 weeks

## 2021-06-15 NOTE — OB PROVIDER H&P - FAMILY HISTORY
Father  Still living? Unknown  Family history of hypertension, Age at diagnosis: Age Unknown  Family history of sarcoidosis, Age at diagnosis: Age Unknown     Sibling  Still living? Unknown  Family history of anemia, Age at diagnosis: Age Unknown

## 2021-06-15 NOTE — OB PROVIDER H&P - NSHPPHYSICALEXAM_GEN_ALL_CORE
Objective  – Vital Signs  Vital Signs Last 24 Hrs  T(C): 36.9 (15 Silvio 2021 12:41), Max: 36.9 (15 Silvio 2021 12:41)  T(F): 98.4 (15 Silvio 2021 12:41), Max: 98.4 (15 Silvio 2021 12:41)  HR: 115 (15 Silvio 2021 12:41) (115 - 115)  BP: 120/72 (15 Silvio 2021 12:41) (120/72 - 120/72)  RR: 17 (15 Silvio 2021 12:41) (17 - 17)    – PE:   CV: RRR  Pulm: breathing comfortably on RA  Abd: gravid, nontender  Extr: moving all extremities with ease  – VE: 1/50/-3  – FHT: baseline 140, mod variability, +accels, -decels  – [place]: irritability  – EFW: 3100g by sono  – Sono: vertex

## 2021-06-15 NOTE — OB PROVIDER H&P - ASSESSMENT
Assessment  – gHTN w/ h/o sPEC in prior pregnancy. GBS positive. Assessment  –  gHTN w/ h/o sPEC in prior pregnancy. GBS positive.

## 2021-06-15 NOTE — OB PROVIDER H&P - PROBLEM SELECTOR PLAN 1
Plan  1. Admit to LND. Routine Labs. IVF.  2. IOL w/ PO Cytotec.  3. Fetus: cat 1 tracing. VTX. EFW 3100g by sono. Continuous EFM. Sono. No concerns.  4. Prenatal issues: gHTN, h/o sPEC/Mg. HELLP labs sent.  5. GBS positive, will order Ampicilin  6. Pain: IV pain meds/epidural PRN    Patient discussed with attending physician, Dr. Mcdonald.  Nano Peralta MD PGY1

## 2021-06-15 NOTE — OB RN PATIENT PROFILE - PSH
Fetal demise > 22 weeks, delivered, current hospitalization  2015  H/O benign breast biopsy  2015   Fetal demise > 22 weeks, delivered, current hospitalization  2015  H/O benign breast biopsy  2015  History of D&C

## 2021-06-15 NOTE — OB PROVIDER H&P - HISTORY OF PRESENT ILLNESS
26 yo  female  at 39 weeks presenting for induction of labor due to high risk of preeclampsia. Pt was told she has protein in urine. Denied headache, blurry vision or RUQ pain. Pt endorsed fetal movement. Denied vaginal bleeding, contractions or LOF. EFW is 6 lb 12 oz. Pt reported she was found to be Group B Strep positive at prior visit ~2 weeks ago. Pt denied history of fibroids, STIs, or abnormal pap smears.  Admission H&P    Subjective  HPI: 26 yo  female  at 39 weeks presents for scheduled IOL / gHTN. Patient states that she has been monitoring her BPs, and they are typically 110-130/60s. She does not take home meds. She has h/o sPEC with first pregnancy, but denies headache, blurry vision, chest pain, sob, RUQ or leg swelling today or throughout this pregnancy. Endorses fetal movement. Denies vaginal bleeding, contractions or LOF.     – PNC: Denies prenatal issues. GBS positive. EFW 3100g by dante.  – OBHx:   2015-IUFD@40wk c/b sPEC/Mg  2017-ETOP @6wk s/p D&C  2018#   2019#3   – GynHx: denies  – PMH: ROLANDO s/p Fe transfusions, most recent one this week  – PSH: D&C, L breast Bx in   – Psych: denies  – Social: denies   – Meds: PNV   – Allergies: NKDA  – Will accept blood transfusions? Yes Admission H&P    Subjective  HPI: 24 yo  female  at 39 weeks presents for scheduled IOL / gHTN. Patient states that she has been monitoring her BPs, and they are typically 110-130/60s. She does not take home meds. She has h/o sPEC with first pregnancy, but denies headache, blurry vision, chest pain, sob, RUQ or leg swelling today or throughout this pregnancy. Endorses fetal movement. Denies vaginal bleeding, contractions or LOF.     – PNC: Denies prenatal issues. GBS positive. EFW 3100g by dante.  – OBHx:   2015-IUFD@40wk c/b sPEC/Mg  2017-ETOP @6wk s/p D&C  2018#   2019#3   – GynHx: denies  – PMH: ROLANDO s/p Fe transfusions, most recent one this week  – PSH: D&C, L breast Bx in   – Psych: denies  – Social: denies   – Meds: PNV   – Allergies: NKDA  – Will accept blood transfusions? Yes

## 2021-06-15 NOTE — CHART NOTE - NSCHARTNOTEFT_GEN_A_CORE
24 yo  [facility] 2021 presents to office for induction of labor due to poor obstetrical history. Patient with history of IUFD full term followed by two successful  at 39wks ga.  Prenatal care significant for severe anemia requiring IV iron, gestational hypertension and late registrant.   Patient offers no complaints today. Denies any painful contractions, vaginal bleeding, dysuria. Reports active fetal movements  VS T 36.9  P 115 R 17  Bp 120/72    heent nl  abd gravid, obese toco no ctx   moderate variability category I  VE 1/50/-3    A; 24 yo P2 with IUP 39 0/7wks ga, poor OB history, Gestational HTN asymptomatic; GBS+  P: Admit to L&D     BEdside sono to confirm presentation      IOL with PO cytotec     HELLP labs     Ampicillin for GBS prophylaxis     Anticipate      MBeauvil

## 2021-06-15 NOTE — OB RN PATIENT PROFILE - PMH
Preeclampsia     Gestational hypertension, third trimester    Iron deficiency anemia, unspecified iron deficiency anemia type    Preeclampsia    Termination of pregnancy (fetus)  at 6 weeks with D&C

## 2021-06-15 NOTE — OB RN PATIENT PROFILE - NS_PARA_OBGYN_ALL_OB_NU
[FreeTextEntry1] : 74 year old male with above hx\par \par atypical chest pain , improved much with prilosec   had negative nuclear perfusion scan , no PE , negative troponin ,  will continue  omeprazole 20 mg po daily \par \par Uncontrolled hypertension with hypertensive heart disease with abnormal EKG , improving on norvasc 10 mg po daily still elevated will give losartan 50 mg po daily , follow up BMP after 3 weeks , \par \par CKD  Mild , stable   . \par \par DM    controlled  HB A1c 7.2  strict DM 
3

## 2021-06-16 ENCOUNTER — APPOINTMENT (OUTPATIENT)
Dept: INFUSION THERAPY | Facility: HOSPITAL | Age: 26
End: 2021-06-16

## 2021-06-16 LAB
ALBUMIN SERPL ELPH-MCNC: 3.7 G/DL — SIGNIFICANT CHANGE UP (ref 3.3–5)
ALP SERPL-CCNC: 152 U/L — HIGH (ref 40–120)
ALT FLD-CCNC: 9 U/L — SIGNIFICANT CHANGE UP (ref 4–33)
ANION GAP SERPL CALC-SCNC: 16 MMOL/L — HIGH (ref 7–14)
APTT BLD: 27 SEC — SIGNIFICANT CHANGE UP (ref 27–36.3)
AST SERPL-CCNC: 16 U/L — SIGNIFICANT CHANGE UP (ref 4–32)
BASOPHILS # BLD AUTO: 0.03 K/UL — SIGNIFICANT CHANGE UP (ref 0–0.2)
BASOPHILS NFR BLD AUTO: 0.3 % — SIGNIFICANT CHANGE UP (ref 0–2)
BILIRUB SERPL-MCNC: 0.4 MG/DL — SIGNIFICANT CHANGE UP (ref 0.2–1.2)
BUN SERPL-MCNC: 7 MG/DL — SIGNIFICANT CHANGE UP (ref 7–23)
CALCIUM SERPL-MCNC: 9.8 MG/DL — SIGNIFICANT CHANGE UP (ref 8.4–10.5)
CHLORIDE SERPL-SCNC: 102 MMOL/L — SIGNIFICANT CHANGE UP (ref 98–107)
CO2 SERPL-SCNC: 17 MMOL/L — LOW (ref 22–31)
CREAT SERPL-MCNC: 0.61 MG/DL — SIGNIFICANT CHANGE UP (ref 0.5–1.3)
EOSINOPHIL # BLD AUTO: 0.03 K/UL — SIGNIFICANT CHANGE UP (ref 0–0.5)
EOSINOPHIL NFR BLD AUTO: 0.3 % — SIGNIFICANT CHANGE UP (ref 0–6)
FIBRINOGEN PPP-MCNC: 700 MG/DL — HIGH (ref 290–520)
GLUCOSE SERPL-MCNC: 87 MG/DL — SIGNIFICANT CHANGE UP (ref 70–99)
HCT VFR BLD CALC: 33.2 % — LOW (ref 34.5–45)
HGB BLD-MCNC: 9.8 G/DL — LOW (ref 11.5–15.5)
IANC: 9.63 K/UL — HIGH (ref 1.5–8.5)
IMM GRANULOCYTES NFR BLD AUTO: 1.4 % — SIGNIFICANT CHANGE UP (ref 0–1.5)
INR BLD: 1.03 RATIO — SIGNIFICANT CHANGE UP (ref 0.88–1.16)
LDH SERPL L TO P-CCNC: 189 U/L — SIGNIFICANT CHANGE UP (ref 135–225)
LYMPHOCYTES # BLD AUTO: 1.56 K/UL — SIGNIFICANT CHANGE UP (ref 1–3.3)
LYMPHOCYTES # BLD AUTO: 13 % — SIGNIFICANT CHANGE UP (ref 13–44)
MCHC RBC-ENTMCNC: 23.1 PG — LOW (ref 27–34)
MCHC RBC-ENTMCNC: 29.5 GM/DL — LOW (ref 32–36)
MCV RBC AUTO: 78.3 FL — LOW (ref 80–100)
MONOCYTES # BLD AUTO: 0.57 K/UL — SIGNIFICANT CHANGE UP (ref 0–0.9)
MONOCYTES NFR BLD AUTO: 4.8 % — SIGNIFICANT CHANGE UP (ref 2–14)
NEUTROPHILS # BLD AUTO: 9.63 K/UL — HIGH (ref 1.8–7.4)
NEUTROPHILS NFR BLD AUTO: 80.2 % — HIGH (ref 43–77)
NRBC # BLD: 0 /100 WBCS — SIGNIFICANT CHANGE UP
NRBC # FLD: 0 K/UL — SIGNIFICANT CHANGE UP
PLATELET # BLD AUTO: 131 K/UL — LOW (ref 150–400)
POTASSIUM SERPL-MCNC: 3.7 MMOL/L — SIGNIFICANT CHANGE UP (ref 3.5–5.3)
POTASSIUM SERPL-SCNC: 3.7 MMOL/L — SIGNIFICANT CHANGE UP (ref 3.5–5.3)
PROT SERPL-MCNC: 6.9 G/DL — SIGNIFICANT CHANGE UP (ref 6–8.3)
PROTHROM AB SERPL-ACNC: 11.7 SEC — SIGNIFICANT CHANGE UP (ref 10.6–13.6)
RBC # BLD: 4.24 M/UL — SIGNIFICANT CHANGE UP (ref 3.8–5.2)
RBC # FLD: 21.2 % — HIGH (ref 10.3–14.5)
SODIUM SERPL-SCNC: 135 MMOL/L — SIGNIFICANT CHANGE UP (ref 135–145)
T PALLIDUM AB TITR SER: NEGATIVE — SIGNIFICANT CHANGE UP
URATE SERPL-MCNC: 3.1 MG/DL — SIGNIFICANT CHANGE UP (ref 2.5–7)
WBC # BLD: 11.99 K/UL — HIGH (ref 3.8–10.5)
WBC # FLD AUTO: 11.99 K/UL — HIGH (ref 3.8–10.5)

## 2021-06-16 PROCEDURE — 88307 TISSUE EXAM BY PATHOLOGIST: CPT | Mod: 26

## 2021-06-16 RX ORDER — OXYCODONE HYDROCHLORIDE 5 MG/1
5 TABLET ORAL ONCE
Refills: 0 | Status: DISCONTINUED | OUTPATIENT
Start: 2021-06-16 | End: 2021-06-17

## 2021-06-16 RX ORDER — PRAMOXINE HYDROCHLORIDE 150 MG/15G
1 AEROSOL, FOAM RECTAL EVERY 4 HOURS
Refills: 0 | Status: DISCONTINUED | OUTPATIENT
Start: 2021-06-16 | End: 2021-06-17

## 2021-06-16 RX ORDER — SODIUM CHLORIDE 9 MG/ML
3 INJECTION INTRAMUSCULAR; INTRAVENOUS; SUBCUTANEOUS EVERY 8 HOURS
Refills: 0 | Status: DISCONTINUED | OUTPATIENT
Start: 2021-06-16 | End: 2021-06-17

## 2021-06-16 RX ORDER — BENZOCAINE 10 %
1 GEL (GRAM) MUCOUS MEMBRANE EVERY 6 HOURS
Refills: 0 | Status: DISCONTINUED | OUTPATIENT
Start: 2021-06-16 | End: 2021-06-17

## 2021-06-16 RX ORDER — ACETAMINOPHEN 500 MG
975 TABLET ORAL
Refills: 0 | Status: DISCONTINUED | OUTPATIENT
Start: 2021-06-16 | End: 2021-06-17

## 2021-06-16 RX ORDER — IBUPROFEN 200 MG
600 TABLET ORAL EVERY 6 HOURS
Refills: 0 | Status: COMPLETED | OUTPATIENT
Start: 2021-06-16 | End: 2022-05-15

## 2021-06-16 RX ORDER — TETANUS TOXOID, REDUCED DIPHTHERIA TOXOID AND ACELLULAR PERTUSSIS VACCINE, ADSORBED 5; 2.5; 8; 8; 2.5 [IU]/.5ML; [IU]/.5ML; UG/.5ML; UG/.5ML; UG/.5ML
0.5 SUSPENSION INTRAMUSCULAR ONCE
Refills: 0 | Status: DISCONTINUED | OUTPATIENT
Start: 2021-06-16 | End: 2021-06-17

## 2021-06-16 RX ORDER — AER TRAVELER 0.5 G/1
1 SOLUTION RECTAL; TOPICAL EVERY 4 HOURS
Refills: 0 | Status: DISCONTINUED | OUTPATIENT
Start: 2021-06-16 | End: 2021-06-17

## 2021-06-16 RX ORDER — DIPHENHYDRAMINE HCL 50 MG
25 CAPSULE ORAL EVERY 6 HOURS
Refills: 0 | Status: DISCONTINUED | OUTPATIENT
Start: 2021-06-16 | End: 2021-06-17

## 2021-06-16 RX ORDER — DIBUCAINE 1 %
1 OINTMENT (GRAM) RECTAL EVERY 6 HOURS
Refills: 0 | Status: DISCONTINUED | OUTPATIENT
Start: 2021-06-16 | End: 2021-06-17

## 2021-06-16 RX ORDER — IBUPROFEN 200 MG
1 TABLET ORAL
Qty: 20 | Refills: 0
Start: 2021-06-16 | End: 2021-06-20

## 2021-06-16 RX ORDER — ACETAMINOPHEN 500 MG
2 TABLET ORAL
Qty: 40 | Refills: 0
Start: 2021-06-16 | End: 2021-06-20

## 2021-06-16 RX ORDER — OXYCODONE HYDROCHLORIDE 5 MG/1
5 TABLET ORAL
Refills: 0 | Status: DISCONTINUED | OUTPATIENT
Start: 2021-06-16 | End: 2021-06-17

## 2021-06-16 RX ORDER — HYDROCORTISONE 1 %
1 OINTMENT (GRAM) TOPICAL EVERY 6 HOURS
Refills: 0 | Status: DISCONTINUED | OUTPATIENT
Start: 2021-06-16 | End: 2021-06-17

## 2021-06-16 RX ORDER — LANOLIN
1 OINTMENT (GRAM) TOPICAL EVERY 6 HOURS
Refills: 0 | Status: DISCONTINUED | OUTPATIENT
Start: 2021-06-16 | End: 2021-06-17

## 2021-06-16 RX ORDER — IBUPROFEN 200 MG
600 TABLET ORAL EVERY 6 HOURS
Refills: 0 | Status: DISCONTINUED | OUTPATIENT
Start: 2021-06-16 | End: 2021-06-17

## 2021-06-16 RX ORDER — MAGNESIUM HYDROXIDE 400 MG/1
30 TABLET, CHEWABLE ORAL
Refills: 0 | Status: DISCONTINUED | OUTPATIENT
Start: 2021-06-16 | End: 2021-06-17

## 2021-06-16 RX ORDER — KETOROLAC TROMETHAMINE 30 MG/ML
30 SYRINGE (ML) INJECTION ONCE
Refills: 0 | Status: DISCONTINUED | OUTPATIENT
Start: 2021-06-16 | End: 2021-06-16

## 2021-06-16 RX ORDER — SIMETHICONE 80 MG/1
80 TABLET, CHEWABLE ORAL EVERY 4 HOURS
Refills: 0 | Status: DISCONTINUED | OUTPATIENT
Start: 2021-06-16 | End: 2021-06-17

## 2021-06-16 RX ORDER — OXYTOCIN 10 UNIT/ML
333.33 VIAL (ML) INJECTION
Qty: 20 | Refills: 0 | Status: DISCONTINUED | OUTPATIENT
Start: 2021-06-16 | End: 2021-06-16

## 2021-06-16 RX ADMIN — Medication 600 MILLIGRAM(S): at 08:22

## 2021-06-16 RX ADMIN — Medication 600 MILLIGRAM(S): at 16:20

## 2021-06-16 RX ADMIN — SODIUM CHLORIDE 3 MILLILITER(S): 9 INJECTION INTRAMUSCULAR; INTRAVENOUS; SUBCUTANEOUS at 06:37

## 2021-06-16 RX ADMIN — SODIUM CHLORIDE 3 MILLILITER(S): 9 INJECTION INTRAMUSCULAR; INTRAVENOUS; SUBCUTANEOUS at 20:36

## 2021-06-16 RX ADMIN — PRAMOXINE HYDROCHLORIDE 1 APPLICATION(S): 150 AEROSOL, FOAM RECTAL at 05:50

## 2021-06-16 RX ADMIN — Medication 975 MILLIGRAM(S): at 05:37

## 2021-06-16 RX ADMIN — Medication 600 MILLIGRAM(S): at 15:29

## 2021-06-16 RX ADMIN — SODIUM CHLORIDE 3 MILLILITER(S): 9 INJECTION INTRAMUSCULAR; INTRAVENOUS; SUBCUTANEOUS at 12:14

## 2021-06-16 RX ADMIN — AER TRAVELER 1 APPLICATION(S): 0.5 SOLUTION RECTAL; TOPICAL at 05:49

## 2021-06-16 RX ADMIN — Medication 975 MILLIGRAM(S): at 13:30

## 2021-06-16 RX ADMIN — Medication 975 MILLIGRAM(S): at 12:30

## 2021-06-16 RX ADMIN — Medication 1 APPLICATION(S): at 05:49

## 2021-06-16 RX ADMIN — Medication 600 MILLIGRAM(S): at 21:19

## 2021-06-16 RX ADMIN — Medication 600 MILLIGRAM(S): at 20:25

## 2021-06-16 RX ADMIN — Medication 600 MILLIGRAM(S): at 11:20

## 2021-06-16 RX ADMIN — Medication 975 MILLIGRAM(S): at 19:11

## 2021-06-16 RX ADMIN — Medication 975 MILLIGRAM(S): at 04:39

## 2021-06-16 RX ADMIN — Medication 1000 MILLIUNIT(S)/MIN: at 04:21

## 2021-06-16 RX ADMIN — Medication 975 MILLIGRAM(S): at 18:27

## 2021-06-16 RX ADMIN — Medication 1 TABLET(S): at 12:29

## 2021-06-16 NOTE — DISCHARGE NOTE OB - PLAN OF CARE
Pelvic rest x 6wks normal blood pressures Continue IRon supplement daily and continue Prenatal vitamins Full recovery Improve anemia

## 2021-06-16 NOTE — DISCHARGE NOTE OB - CARE PLAN
Principal Discharge DX:	Term birth of male   Goal:	Full recovery  Assessment and plan of treatment:	Pelvic rest x 6wks  Secondary Diagnosis:	Preeclampsia, third trimester  Goal:	normal blood pressures  Secondary Diagnosis:	Anemia  Goal:	Continue IRon supplement daily and continue Prenatal vitamins  Secondary Diagnosis:	Iron deficiency anemia secondary to inadequate dietary iron intake  Goal:	Improve anemia

## 2021-06-16 NOTE — OB POSTPARTUM EVENT NOTE - NS_EVENTFINDINGS1_OBGYN_ALL_OB_FT
MD Mcdonald present at bedside, EBL 100cc of clots expressed by MD at this time. RN to continue to monitor. Total EBL 400cc. No further orders given.

## 2021-06-16 NOTE — OB RN DELIVERY SUMMARY - NSSELHIDDEN_OBGYN_ALL_OB_FT
[NS_DeliveryAttending1_OBGYN_ALL_OB_FT:[id number]],[NS_DeliveryAssist1_OBGYN_ALL_OB_FT:[id number]],[NS_DeliveryRN_OBGYN_ALL_OB_FT:[id number]]

## 2021-06-16 NOTE — OB POSTPARTUM EVENT NOTE - NS_EVENTPTSUMMARY1_OBGYN_ALL_OB_FT
VS Stable (see OB flow sheet)  pt has no void since delivery, cevallos d/c @ 0200, 500cc urine noted  Fundus firm and at umbilicus with moderate-heavy rubra bleeding noted.

## 2021-06-16 NOTE — DISCHARGE NOTE OB - MEDICATION SUMMARY - MEDICATIONS TO TAKE
I will START or STAY ON the medications listed below when I get home from the hospital:    Tylenol Extra Strength 500 mg oral tablet  -- 2 tab(s) by mouth every 6 hours   -- This product contains acetaminophen.  Do not use  with any other product containing acetaminophen to prevent possible liver damage.    -- Indication: For Gestational hypertension, third trimester     mg oral tablet  -- 1 tab(s) by mouth every 6 hours   -- Do not take this drug if you are pregnant.  It is very important that you take or use this exactly as directed.  Do not skip doses or discontinue unless directed by your doctor.  May cause drowsiness or dizziness.  Obtain medical advice before taking any non-prescription drugs as some may affect the action of this medication.  Take with food or milk.    -- Indication: For Labor and delivery, indication for care

## 2021-06-16 NOTE — DISCHARGE NOTE OB - CARE PROVIDER_API CALL
[facility]  OBSTETRICS AND GYNECOLOGY  [address] [place] [address]  Phone: [phone]  Fax: [phone]  Follow Up Time:

## 2021-06-16 NOTE — DISCHARGE NOTE OB - PATIENT PORTAL LINK FT
You can access the FollowMyHealth Patient Portal offered by [facility] by registering at the following website: [email or web address]. By joining [facility]’s FollowMyHealth portal, you will also be able to view your health information using other applications (apps) compatible with our system.

## 2021-06-16 NOTE — DISCHARGE NOTE OB - MATERIALS PROVIDED
Vaccinations/[facility]  Screening Program/  Immunization Record/Breastfeeding Log/Breastfeeding Mother’s Support Group Information/Guide to Postpartum Care/[facility] Hearing Screen Program/Back To Sleep Handout/Shaken Baby Prevention Handout/Breastfeeding Guide and Packet/Birth Certificate Instructions/Discharge Medication Information for Patients and Families Pocket Guide

## 2021-06-16 NOTE — DISCHARGE NOTE OB - HOSPITAL COURSE
Patient presented to labor and delivery for IOL due to mild preeclampsia and poor OB history. A live male infant was born Apgar 9-9, [id number].  The patient had uneventful hospital course and discharged in stable condition.

## 2021-06-16 NOTE — OB RN DELIVERY SUMMARY - NS_SEPSISRSKCALC_OBGYN_ALL_OB_FT
EOS calculated successfully. EOS Risk Factor: 0.5/1000 live births ([facility] [place] national incidence); GA=39w1d; Temp=98.42; ROM=3.067; GBS='Positive'; Antibiotics='GBS specific antibiotics > 2 hrs prior to birth'

## 2021-06-16 NOTE — OB PROVIDER DELIVERY SUMMARY - NSSELHIDDEN_OBGYN_ALL_OB_FT
[NS_DeliveryAttending1_OBGYN_ALL_OB_FT:[id number]],[NS_DeliveryAssist1_OBGYN_ALL_OB_FT:[id number]],[NS_DeliveryRN_OBGYN_ALL_OB_FT:[id number]=]

## 2021-06-16 NOTE — DISCHARGE NOTE OB - SECONDARY DIAGNOSIS.
Anemia Iron deficiency anemia secondary to inadequate dietary iron intake Preeclampsia, third trimester

## 2021-06-16 NOTE — OB POSTPARTUM EVENT NOTE - NS_EVENTSUMMARY1_OBGYN_ALL_OB_FT
Pt is , sp  Of viable male @0204. A&Ox4.  from delivery, rectal Cytotec 1000mcg administered by MD Leal prophylactically at delivery. Heavy bleeding noted with fundal check and gushes of blood noted upon fundal palpation. MD Mcdonald made aware.

## 2021-06-16 NOTE — OB PROVIDER DELIVERY SUMMARY - NSPROVIDERDELIVERYNOTE_OBGYN_ALL_OB_FT
A live male infant was born in BIJAL position via . The head and body were delivered atraumatically over an intact perineum and placed on mother's abdomen.  The infants mouth and nose were suctioned using a bulb. Delayed cord clamping was performed then infant was handed off to waiting RN.  The placenta was delivered spontaneously. The uterus was massaged and fundus noted to be firm. Due to bogginess of lower uterine segment 1000mcg of rectal cytotec given with good response.  The uterus was massaged again and small clots expressed.  The vaginal vault was inspected.  Sponge, needle and instrument count correct x 2. Infants Agpar 9-9, 3150g. EBL 400ml. Placenta to pathology.  MBeauvil

## 2021-06-16 NOTE — DISCHARGE NOTE OB - AVOID TUB BATHING UNTIL YOUR POSTPARTUM VISIT
Controlled Substance Review    [place] or [place]  reviewed: No red flags were identified; safe to proceed with prescription  Edwige Fuller     PDMP Review       Value Time User    PDMP Reviewed  Yes 2/24/2021 11:05 AM Dominic Youssef PA-C
Statement Selected

## 2021-06-16 NOTE — OB PROVIDER LABOR PROGRESS NOTE - ASSESSMENT
Patient making cervical change, feeling pressure but not sharp pain.     Continue plan as per Dr. Mcdonald.     JASIEL Marino, PGY1

## 2021-06-17 VITALS
SYSTOLIC BLOOD PRESSURE: 136 MMHG | OXYGEN SATURATION: 99 % | RESPIRATION RATE: 17 BRPM | HEART RATE: 82 BPM | DIASTOLIC BLOOD PRESSURE: 85 MMHG | TEMPERATURE: 98 F

## 2021-06-17 RX ORDER — BNT162B2 0.23 MG/2.25ML
0.3 INJECTION, SUSPENSION INTRAMUSCULAR ONCE
Refills: 0 | Status: COMPLETED | OUTPATIENT
Start: 2021-06-17 | End: 2021-06-17

## 2021-06-17 RX ADMIN — Medication 1 TABLET(S): at 12:37

## 2021-06-17 RX ADMIN — Medication 975 MILLIGRAM(S): at 16:35

## 2021-06-17 RX ADMIN — SODIUM CHLORIDE 3 MILLILITER(S): 9 INJECTION INTRAMUSCULAR; INTRAVENOUS; SUBCUTANEOUS at 06:06

## 2021-06-17 RX ADMIN — Medication 600 MILLIGRAM(S): at 07:03

## 2021-06-17 RX ADMIN — Medication 600 MILLIGRAM(S): at 12:38

## 2021-06-17 RX ADMIN — Medication 975 MILLIGRAM(S): at 10:40

## 2021-06-17 RX ADMIN — Medication 975 MILLIGRAM(S): at 16:09

## 2021-06-17 RX ADMIN — Medication 975 MILLIGRAM(S): at 03:28

## 2021-06-17 RX ADMIN — Medication 600 MILLIGRAM(S): at 06:27

## 2021-06-17 RX ADMIN — BNT162B2 0.3 MILLILITER(S): 0.23 INJECTION, SUSPENSION INTRAMUSCULAR at 15:48

## 2021-06-17 RX ADMIN — Medication 975 MILLIGRAM(S): at 02:32

## 2021-06-17 RX ADMIN — Medication 975 MILLIGRAM(S): at 09:41

## 2021-06-17 RX ADMIN — Medication 600 MILLIGRAM(S): at 13:30

## 2021-06-17 NOTE — PROGRESS NOTE ADULT - SUBJECTIVE AND OBJECTIVE BOX
S: 25y PPD#1  Patient doing well. Minimal to moderate. lochia. Pain controlled. Voiding. Passing Flatus. Tolerating a regular diet.   Denies h/a, changes in vision or RUQ pain,   O: Vital Signs Last 24 Hrs  T(C): 36.7 (2021 06:42), Max: 37.2 (2021 14:18)  T(F): 98.1 (2021 06:42), Max: 98.9 (2021 14:18)  HR: 87 (2021 06:42) (87 - 99)  BP: 117/75 (2021 06:42) (117/75 - 140/82)  RR: 17 (2021 06:42) (16 - 18)  SpO2: 100% (2021 06:42) (99% - 100%)    Gen: NAD  Abd: soft, NT, ND, fundus firm below umbilicus  Lochia: moderate  Ext: no tenderness    Labs:                        9.8    11.99 )-----------( 131      ( 2021 03:34 )             33.2         A: 25y PPD#1 s/p  doing well. Mild anemia, advised on po iron pp.     Plan: Increase ambulation. Continue Regular diet. Tylenol and Motrin q6 hrs. Oxycodone prn. Discharge home today.   Patient instructed on s/s of PECc and advised to  follow up with ob for pp visit.    MD ruben

## 2021-06-17 NOTE — PROGRESS NOTE ADULT - SUBJECTIVE AND OBJECTIVE BOX
Subjective  Pain: well controlled  Complaints: none  Milestones: Alert and orientedx3. Out of bed ambulating. Voiding/Due to void. Tolerating a regular diet.  Infant feeding:   breast                              Feeding related issues or concerns: none  Objective   T(C): 36.7 (06-17-21 @ 06:42), Max: 37.2 (06-16-21 @ 14:18)  HR: 87 (06-17-21 @ 06:42) (87 - 99)  BP: 117/75 (06-17-21 @ 06:42) (117/75 - 140/82)  RR: 17 (06-17-21 @ 06:42) (16 - 18)  SpO2: 100% (06-17-21 @ 06:42) (99% - 100%)  Wt(kg): --      Assessment      26 y/o G 5 P 3 .Day #1  postpartum. Preeclampcia, labs nl, narendra HA,Blurred vision, epigastric pain or any other discomforts  Assisted delivery (vacuum, forceps):  Indication for assisted delivery:  Past medical history significant for gHTN, h/o IUFD at 40wks  Current Issues: none  Breasts: soft  Abdomen: Soft, nontender, nondistended.  Vagina: Lochia moderate  Perineum:  intact,   Laceration/episiotomy site:  Lower extremities: No edema noted bilaterally of lower extremities. Nontender calves.  Negative Ana's sign. Positive pedal pulses.  Other relevant physical exam findings;none      Plan  Plan: Increase ambulation, analgesia PRN and pain medication protocal standing oxycodone, ibuprofen and acetaminophen.  Diet: Continue regular diet  Continue routine postpartum care.

## 2021-06-18 ENCOUNTER — APPOINTMENT (OUTPATIENT)
Dept: INFUSION THERAPY | Facility: HOSPITAL | Age: 26
End: 2021-06-18

## 2021-06-18 ENCOUNTER — LABORATORY RESULT (OUTPATIENT)
Age: 26
End: 2021-06-18

## 2021-06-18 PROBLEM — Z33.2 ENCOUNTER FOR ELECTIVE TERMINATION OF PREGNANCY: Chronic | Status: ACTIVE | Noted: 2021-06-15

## 2021-06-18 PROBLEM — D50.9 IRON DEFICIENCY ANEMIA, UNSPECIFIED: Chronic | Status: ACTIVE | Noted: 2021-06-15

## 2021-06-21 ENCOUNTER — NON-APPOINTMENT (OUTPATIENT)
Age: 26
End: 2021-06-21

## 2021-06-23 DIAGNOSIS — Z80.0 FAMILY HISTORY OF MALIGNANT NEOPLASM OF DIGESTIVE ORGANS: ICD-10-CM

## 2021-06-23 DIAGNOSIS — Z78.9 OTHER SPECIFIED HEALTH STATUS: ICD-10-CM

## 2021-06-23 RX ORDER — IBUPROFEN 200 MG
TABLET ORAL
Refills: 0 | Status: DISCONTINUED | COMMUNITY
Start: 2021-06-18 | End: 2021-06-23

## 2021-06-23 RX ORDER — ACETAMINOPHEN 500 MG/1
500 TABLET ORAL
Refills: 0 | Status: DISCONTINUED | COMMUNITY
Start: 2021-06-18 | End: 2021-06-23

## 2021-06-29 ENCOUNTER — NON-APPOINTMENT (OUTPATIENT)
Age: 26
End: 2021-06-29

## 2021-07-02 LAB — SURGICAL PATHOLOGY STUDY: SIGNIFICANT CHANGE UP

## 2021-07-06 ENCOUNTER — NON-APPOINTMENT (OUTPATIENT)
Age: 26
End: 2021-07-06

## 2021-07-08 ENCOUNTER — APPOINTMENT (OUTPATIENT)
Dept: CARDIOLOGY | Facility: CLINIC | Age: 26
End: 2021-07-08

## 2021-07-10 ENCOUNTER — APPOINTMENT (OUTPATIENT)
Dept: DISASTER EMERGENCY | Facility: OTHER | Age: 26
End: 2021-07-10

## 2021-07-13 ENCOUNTER — NON-APPOINTMENT (OUTPATIENT)
Age: 26
End: 2021-07-13

## 2021-07-14 DIAGNOSIS — O99.213 OBESITY COMPLICATING PREGNANCY, THIRD TRIMESTER: ICD-10-CM

## 2021-07-14 DIAGNOSIS — Z3A.36 36 WEEKS GESTATION OF PREGNANCY: ICD-10-CM

## 2021-07-14 DIAGNOSIS — O09.293 SUPERVISION OF PREGNANCY WITH OTHER POOR REPRODUCTIVE OR OBSTETRIC HISTORY, THIRD TRIMESTER: ICD-10-CM

## 2021-07-14 DIAGNOSIS — O09.33 SUPERVISION OF PREGNANCY WITH INSUFFICIENT ANTENATAL CARE, THIRD TRIMESTER: ICD-10-CM

## 2021-07-21 DIAGNOSIS — O99.213 OBESITY COMPLICATING PREGNANCY, THIRD TRIMESTER: ICD-10-CM

## 2021-07-21 DIAGNOSIS — O09.33 SUPERVISION OF PREGNANCY WITH INSUFFICIENT ANTENATAL CARE, THIRD TRIMESTER: ICD-10-CM

## 2021-07-21 DIAGNOSIS — O09.293 SUPERVISION OF PREGNANCY WITH OTHER POOR REPRODUCTIVE OR OBSTETRIC HISTORY, THIRD TRIMESTER: ICD-10-CM

## 2021-07-21 DIAGNOSIS — Z3A.37 37 WEEKS GESTATION OF PREGNANCY: ICD-10-CM

## 2021-09-17 ENCOUNTER — OUTPATIENT (OUTPATIENT)
Dept: OUTPATIENT SERVICES | Facility: HOSPITAL | Age: 26
LOS: 1 days | Discharge: HOME | End: 2021-09-17

## 2021-09-17 DIAGNOSIS — Z98.890 OTHER SPECIFIED POSTPROCEDURAL STATES: Chronic | ICD-10-CM

## 2021-09-17 DIAGNOSIS — O36.4XX0 MATERNAL CARE FOR INTRAUTERINE DEATH, NOT APPLICABLE OR UNSPECIFIED: Chronic | ICD-10-CM

## 2021-09-17 DIAGNOSIS — D64.9 ANEMIA, UNSPECIFIED: ICD-10-CM

## 2021-10-13 ENCOUNTER — APPOINTMENT (OUTPATIENT)
Dept: HEMATOLOGY ONCOLOGY | Facility: CLINIC | Age: 26
End: 2021-10-13

## 2021-12-09 ENCOUNTER — RESULT REVIEW (OUTPATIENT)
Age: 26
End: 2021-12-09

## 2021-12-09 ENCOUNTER — INPATIENT (INPATIENT)
Facility: HOSPITAL | Age: 26
LOS: 2 days | Discharge: HOME | End: 2021-12-12
Attending: OBSTETRICS & GYNECOLOGY | Admitting: OBSTETRICS & GYNECOLOGY
Payer: MEDICAID

## 2021-12-09 VITALS
OXYGEN SATURATION: 99 % | HEART RATE: 110 BPM | RESPIRATION RATE: 18 BRPM | DIASTOLIC BLOOD PRESSURE: 68 MMHG | HEIGHT: 63 IN | SYSTOLIC BLOOD PRESSURE: 146 MMHG | TEMPERATURE: 100 F

## 2021-12-09 DIAGNOSIS — N93.9 ABNORMAL UTERINE AND VAGINAL BLEEDING, UNSPECIFIED: ICD-10-CM

## 2021-12-09 DIAGNOSIS — O36.4XX0 MATERNAL CARE FOR INTRAUTERINE DEATH, NOT APPLICABLE OR UNSPECIFIED: Chronic | ICD-10-CM

## 2021-12-09 DIAGNOSIS — Z98.890 OTHER SPECIFIED POSTPROCEDURAL STATES: Chronic | ICD-10-CM

## 2021-12-09 LAB
ALBUMIN SERPL ELPH-MCNC: 3.5 G/DL — SIGNIFICANT CHANGE UP (ref 3.3–5)
ALP SERPL-CCNC: 82 U/L — SIGNIFICANT CHANGE UP (ref 40–120)
ALT FLD-CCNC: 8 U/L — SIGNIFICANT CHANGE UP (ref 4–33)
ANION GAP SERPL CALC-SCNC: 13 MMOL/L — SIGNIFICANT CHANGE UP (ref 7–14)
AST SERPL-CCNC: 14 U/L — SIGNIFICANT CHANGE UP (ref 4–32)
BASOPHILS # BLD AUTO: 0.01 K/UL — SIGNIFICANT CHANGE UP (ref 0–0.2)
BASOPHILS NFR BLD AUTO: 0.1 % — SIGNIFICANT CHANGE UP (ref 0–2)
BILIRUB SERPL-MCNC: 0.5 MG/DL — SIGNIFICANT CHANGE UP (ref 0.2–1.2)
BLD GP AB SCN SERPL QL: NEGATIVE — SIGNIFICANT CHANGE UP
BUN SERPL-MCNC: 9 MG/DL — SIGNIFICANT CHANGE UP (ref 7–23)
CALCIUM SERPL-MCNC: 9.1 MG/DL — SIGNIFICANT CHANGE UP (ref 8.4–10.5)
CHLORIDE SERPL-SCNC: 101 MMOL/L — SIGNIFICANT CHANGE UP (ref 98–107)
CO2 SERPL-SCNC: 21 MMOL/L — LOW (ref 22–31)
CREAT SERPL-MCNC: 0.56 MG/DL — SIGNIFICANT CHANGE UP (ref 0.5–1.3)
EOSINOPHIL # BLD AUTO: 0.04 K/UL — SIGNIFICANT CHANGE UP (ref 0–0.5)
EOSINOPHIL NFR BLD AUTO: 0.4 % — SIGNIFICANT CHANGE UP (ref 0–6)
GLUCOSE SERPL-MCNC: 85 MG/DL — SIGNIFICANT CHANGE UP (ref 70–99)
HCG SERPL-ACNC: 5712 MIU/ML — SIGNIFICANT CHANGE UP
HCT VFR BLD CALC: 30.1 % — LOW (ref 34.5–45)
HGB BLD-MCNC: 9.5 G/DL — LOW (ref 11.5–15.5)
IANC: 7.72 K/UL — SIGNIFICANT CHANGE UP (ref 1.5–8.5)
IMM GRANULOCYTES NFR BLD AUTO: 0.7 % — SIGNIFICANT CHANGE UP (ref 0–1.5)
LYMPHOCYTES # BLD AUTO: 0.83 K/UL — LOW (ref 1–3.3)
LYMPHOCYTES # BLD AUTO: 9.1 % — LOW (ref 13–44)
MCHC RBC-ENTMCNC: 25.7 PG — LOW (ref 27–34)
MCHC RBC-ENTMCNC: 31.6 GM/DL — LOW (ref 32–36)
MCV RBC AUTO: 81.6 FL — SIGNIFICANT CHANGE UP (ref 80–100)
MONOCYTES # BLD AUTO: 0.44 K/UL — SIGNIFICANT CHANGE UP (ref 0–0.9)
MONOCYTES NFR BLD AUTO: 4.8 % — SIGNIFICANT CHANGE UP (ref 2–14)
NEUTROPHILS # BLD AUTO: 7.72 K/UL — HIGH (ref 1.8–7.4)
NEUTROPHILS NFR BLD AUTO: 84.9 % — HIGH (ref 43–77)
NRBC # BLD: 0 /100 WBCS — SIGNIFICANT CHANGE UP
NRBC # FLD: 0 K/UL — SIGNIFICANT CHANGE UP
PLATELET # BLD AUTO: 141 K/UL — LOW (ref 150–400)
POTASSIUM SERPL-MCNC: 3.5 MMOL/L — SIGNIFICANT CHANGE UP (ref 3.5–5.3)
POTASSIUM SERPL-SCNC: 3.5 MMOL/L — SIGNIFICANT CHANGE UP (ref 3.5–5.3)
PROT SERPL-MCNC: 7 G/DL — SIGNIFICANT CHANGE UP (ref 6–8.3)
RBC # BLD: 3.69 M/UL — LOW (ref 3.8–5.2)
RBC # FLD: 13.5 % — SIGNIFICANT CHANGE UP (ref 10.3–14.5)
RH IG SCN BLD-IMP: POSITIVE — SIGNIFICANT CHANGE UP
SODIUM SERPL-SCNC: 135 MMOL/L — SIGNIFICANT CHANGE UP (ref 135–145)
WBC # BLD: 9.1 K/UL — SIGNIFICANT CHANGE UP (ref 3.8–10.5)
WBC # FLD AUTO: 9.1 K/UL — SIGNIFICANT CHANGE UP (ref 3.8–10.5)

## 2021-12-09 PROCEDURE — 76830 TRANSVAGINAL US NON-OB: CPT | Mod: 26

## 2021-12-09 PROCEDURE — 99285 EMERGENCY DEPT VISIT HI MDM: CPT

## 2021-12-09 PROCEDURE — 88304 TISSUE EXAM BY PATHOLOGIST: CPT | Mod: 26

## 2021-12-09 RX ORDER — ACETAMINOPHEN 500 MG
650 TABLET ORAL ONCE
Refills: 0 | Status: COMPLETED | OUTPATIENT
Start: 2021-12-09 | End: 2021-12-09

## 2021-12-09 RX ORDER — SODIUM CHLORIDE 9 MG/ML
1000 INJECTION INTRAMUSCULAR; INTRAVENOUS; SUBCUTANEOUS ONCE
Refills: 0 | Status: COMPLETED | OUTPATIENT
Start: 2021-12-09 | End: 2021-12-09

## 2021-12-09 RX ORDER — PIPERACILLIN AND TAZOBACTAM 4; .5 G/20ML; G/20ML
3.38 INJECTION, POWDER, LYOPHILIZED, FOR SOLUTION INTRAVENOUS ONCE
Refills: 0 | Status: DISCONTINUED | OUTPATIENT
Start: 2021-12-09 | End: 2021-12-09

## 2021-12-09 RX ORDER — ERTAPENEM SODIUM 1 G/1
1000 INJECTION, POWDER, LYOPHILIZED, FOR SOLUTION INTRAMUSCULAR; INTRAVENOUS ONCE
Refills: 0 | Status: COMPLETED | OUTPATIENT
Start: 2021-12-09 | End: 2021-12-09

## 2021-12-09 RX ADMIN — SODIUM CHLORIDE 1000 MILLILITER(S): 9 INJECTION INTRAMUSCULAR; INTRAVENOUS; SUBCUTANEOUS at 20:16

## 2021-12-09 RX ADMIN — Medication 650 MILLIGRAM(S): at 20:16

## 2021-12-09 RX ADMIN — Medication 650 MILLIGRAM(S): at 21:18

## 2021-12-09 NOTE — ED PROVIDER NOTE - NSICDXFAMILYHX_GEN_ALL_CORE_FT
FAMILY HISTORY:  Father  Still living? Unknown  Family history of hypertension, Age at diagnosis: Age Unknown  Family history of sarcoidosis, Age at diagnosis: Age Unknown    Sibling  Still living? Unknown  Family history of anemia, Age at diagnosis: Age Unknown

## 2021-12-09 NOTE — ED ADULT TRIAGE NOTE - CHIEF COMPLAINT QUOTE
Pt brought in by EMS complaining of abdominal cramping and vaginal bleeding. Pt had an  on Saturday. Pt denies chest pain, sob, n/v/d, fever or chills.

## 2021-12-09 NOTE — ED PROVIDER NOTE - CARE PLAN
1 Principal Discharge DX:	Vaginal bleeding in pregnancy   Principal Discharge DX:	Vaginal bleeding  Secondary Diagnosis:	Fever

## 2021-12-09 NOTE — ED PROVIDER NOTE - NSICDXPASTMEDICALHX_GEN_ALL_CORE_FT
PAST MEDICAL HISTORY:  Gestational hypertension, third trimester     Iron deficiency anemia, unspecified iron deficiency anemia type     Preeclampsia     Termination of pregnancy (fetus) at 6 weeks with D&C

## 2021-12-09 NOTE — ED PROVIDER NOTE - OBJECTIVE STATEMENT
25 y/o F presents to ED with vaginal bleeding s/p elective   at outpatient clinic. Pt reports she has had heavy vaginal bleeding, soaking through pads every 40 mins. Also having lower abd cramping. Pt reports feeling lightheaded and dizzy while at work. Has h/o anemia. Pt denies chest pain, sob, cough, n/v/d, headache.  Pt noted to be tachycardic and febrile in Triage

## 2021-12-09 NOTE — ED PROVIDER NOTE - CLINICAL SUMMARY MEDICAL DECISION MAKING FREE TEXT BOX
25 y/o F presents to ED with vaginal bleeding s/p elective   at outpatient clinic.  plan  - labs  - tvus  - OB consult   - ivf 27 y/o F presents to ED with vaginal bleeding s/p elective   at outpatient clinic. Now febrile and tachycardic with large mass of tissue evacuated from vaginal canal  plan  - labs  - tvus  - OB consult   - ivf  - iv abx

## 2021-12-09 NOTE — ED PROVIDER NOTE - NSICDXPASTSURGICALHX_GEN_ALL_CORE_FT
PAST SURGICAL HISTORY:  Fetal demise > 22 weeks, delivered, current hospitalization 2015    H/O benign breast biopsy 2015    History of D&C

## 2021-12-09 NOTE — ED PROVIDER NOTE - GENITOURINARY, MLM
Chaperone PCT Eliana. Heavy vaginal bleeding noted. Evacuated large mass of fibrous tissue with clots.

## 2021-12-09 NOTE — ED PROVIDER NOTE - ATTENDING CONTRIBUTION TO CARE
DR. BOB, ATTENDING MD-  I performed a face to face bedside interview with the patient regarding history of present illness, review of symptoms and past medical history. I completed an independent physical exam.  I have discussed the patient's plan of care with the PA.   Documentation as above in the note.    25 y/o female s/p elective ab on Saturday at 15wks pregnancy bibems with vag bld.  Low grade temp here in ED.  Concern for retained poc, anemia.  Obtain cbc cmp tvus give ivf acetaminophen consult ob/gyn.

## 2021-12-09 NOTE — ED ADULT NURSE NOTE - OBJECTIVE STATEMENT
Pt presents after elective  with increased abdominal cramping, bleeding, pain and febrile. Patient reports weakness and ill feeling. A&Ox3 history of anemia.

## 2021-12-10 ENCOUNTER — TRANSCRIPTION ENCOUNTER (OUTPATIENT)
Age: 26
End: 2021-12-10

## 2021-12-10 DIAGNOSIS — N71.9 INFLAMMATORY DISEASE OF UTERUS, UNSPECIFIED: ICD-10-CM

## 2021-12-10 LAB
ANION GAP SERPL CALC-SCNC: 12 MMOL/L — SIGNIFICANT CHANGE UP (ref 7–14)
APPEARANCE UR: CLEAR — SIGNIFICANT CHANGE UP
B PERT DNA SPEC QL NAA+PROBE: SIGNIFICANT CHANGE UP
B PERT+PARAPERT DNA PNL SPEC NAA+PROBE: SIGNIFICANT CHANGE UP
BACTERIA # UR AUTO: ABNORMAL
BASOPHILS # BLD AUTO: 0.02 K/UL — SIGNIFICANT CHANGE UP (ref 0–0.2)
BASOPHILS NFR BLD AUTO: 0.3 % — SIGNIFICANT CHANGE UP (ref 0–2)
BILIRUB UR-MCNC: NEGATIVE — SIGNIFICANT CHANGE UP
BORDETELLA PARAPERTUSSIS (RAPRVP): SIGNIFICANT CHANGE UP
BUN SERPL-MCNC: 8 MG/DL — SIGNIFICANT CHANGE UP (ref 7–23)
C PNEUM DNA SPEC QL NAA+PROBE: SIGNIFICANT CHANGE UP
CALCIUM SERPL-MCNC: 8.3 MG/DL — LOW (ref 8.4–10.5)
CHLORIDE SERPL-SCNC: 102 MMOL/L — SIGNIFICANT CHANGE UP (ref 98–107)
CO2 SERPL-SCNC: 19 MMOL/L — LOW (ref 22–31)
COLOR SPEC: YELLOW — SIGNIFICANT CHANGE UP
CREAT SERPL-MCNC: 0.6 MG/DL — SIGNIFICANT CHANGE UP (ref 0.5–1.3)
DIFF PNL FLD: ABNORMAL
EOSINOPHIL # BLD AUTO: 0.04 K/UL — SIGNIFICANT CHANGE UP (ref 0–0.5)
EOSINOPHIL NFR BLD AUTO: 0.6 % — SIGNIFICANT CHANGE UP (ref 0–6)
EPI CELLS # UR: 4 /HPF — SIGNIFICANT CHANGE UP (ref 0–5)
FLUAV SUBTYP SPEC NAA+PROBE: SIGNIFICANT CHANGE UP
FLUBV RNA SPEC QL NAA+PROBE: SIGNIFICANT CHANGE UP
GLUCOSE SERPL-MCNC: 81 MG/DL — SIGNIFICANT CHANGE UP (ref 70–99)
GLUCOSE UR QL: NEGATIVE — SIGNIFICANT CHANGE UP
HADV DNA SPEC QL NAA+PROBE: SIGNIFICANT CHANGE UP
HCOV 229E RNA SPEC QL NAA+PROBE: SIGNIFICANT CHANGE UP
HCOV HKU1 RNA SPEC QL NAA+PROBE: SIGNIFICANT CHANGE UP
HCOV NL63 RNA SPEC QL NAA+PROBE: SIGNIFICANT CHANGE UP
HCOV OC43 RNA SPEC QL NAA+PROBE: SIGNIFICANT CHANGE UP
HCT VFR BLD CALC: 27.1 % — LOW (ref 34.5–45)
HGB BLD-MCNC: 8.4 G/DL — LOW (ref 11.5–15.5)
HMPV RNA SPEC QL NAA+PROBE: SIGNIFICANT CHANGE UP
HPIV1 RNA SPEC QL NAA+PROBE: SIGNIFICANT CHANGE UP
HPIV2 RNA SPEC QL NAA+PROBE: SIGNIFICANT CHANGE UP
HPIV3 RNA SPEC QL NAA+PROBE: SIGNIFICANT CHANGE UP
HPIV4 RNA SPEC QL NAA+PROBE: SIGNIFICANT CHANGE UP
HYALINE CASTS # UR AUTO: 2 /LPF — SIGNIFICANT CHANGE UP (ref 0–7)
IANC: 4.9 K/UL — SIGNIFICANT CHANGE UP (ref 1.5–8.5)
IMM GRANULOCYTES NFR BLD AUTO: 0.8 % — SIGNIFICANT CHANGE UP (ref 0–1.5)
KETONES UR-MCNC: ABNORMAL
LEUKOCYTE ESTERASE UR-ACNC: ABNORMAL
LYMPHOCYTES # BLD AUTO: 0.82 K/UL — LOW (ref 1–3.3)
LYMPHOCYTES # BLD AUTO: 13.3 % — SIGNIFICANT CHANGE UP (ref 13–44)
M PNEUMO DNA SPEC QL NAA+PROBE: SIGNIFICANT CHANGE UP
MAGNESIUM SERPL-MCNC: 1.7 MG/DL — SIGNIFICANT CHANGE UP (ref 1.6–2.6)
MCHC RBC-ENTMCNC: 26.2 PG — LOW (ref 27–34)
MCHC RBC-ENTMCNC: 31 GM/DL — LOW (ref 32–36)
MCV RBC AUTO: 84.4 FL — SIGNIFICANT CHANGE UP (ref 80–100)
MONOCYTES # BLD AUTO: 0.33 K/UL — SIGNIFICANT CHANGE UP (ref 0–0.9)
MONOCYTES NFR BLD AUTO: 5.4 % — SIGNIFICANT CHANGE UP (ref 2–14)
NEUTROPHILS # BLD AUTO: 4.9 K/UL — SIGNIFICANT CHANGE UP (ref 1.8–7.4)
NEUTROPHILS NFR BLD AUTO: 79.6 % — HIGH (ref 43–77)
NITRITE UR-MCNC: NEGATIVE — SIGNIFICANT CHANGE UP
NRBC # BLD: 0 /100 WBCS — SIGNIFICANT CHANGE UP
NRBC # FLD: 0 K/UL — SIGNIFICANT CHANGE UP
PH UR: 6.5 — SIGNIFICANT CHANGE UP (ref 5–8)
PHOSPHATE SERPL-MCNC: 2.9 MG/DL — SIGNIFICANT CHANGE UP (ref 2.5–4.5)
PLATELET # BLD AUTO: 123 K/UL — LOW (ref 150–400)
POTASSIUM SERPL-MCNC: 3 MMOL/L — LOW (ref 3.5–5.3)
POTASSIUM SERPL-SCNC: 3 MMOL/L — LOW (ref 3.5–5.3)
PROT UR-MCNC: ABNORMAL
RAPID RVP RESULT: SIGNIFICANT CHANGE UP
RBC # BLD: 3.21 M/UL — LOW (ref 3.8–5.2)
RBC # FLD: 13.6 % — SIGNIFICANT CHANGE UP (ref 10.3–14.5)
RBC CASTS # UR COMP ASSIST: >50 /HPF — SIGNIFICANT CHANGE UP (ref 0–4)
RSV RNA SPEC QL NAA+PROBE: SIGNIFICANT CHANGE UP
RV+EV RNA SPEC QL NAA+PROBE: SIGNIFICANT CHANGE UP
SARS-COV-2 RNA SPEC QL NAA+PROBE: SIGNIFICANT CHANGE UP
SODIUM SERPL-SCNC: 133 MMOL/L — LOW (ref 135–145)
SP GR SPEC: 1.02 — SIGNIFICANT CHANGE UP (ref 1–1.05)
UROBILINOGEN FLD QL: SIGNIFICANT CHANGE UP
WBC # BLD: 6.16 K/UL — SIGNIFICANT CHANGE UP (ref 3.8–10.5)
WBC # FLD AUTO: 6.16 K/UL — SIGNIFICANT CHANGE UP (ref 3.8–10.5)
WBC UR QL: SIGNIFICANT CHANGE UP /HPF (ref 0–5)

## 2021-12-10 RX ORDER — ERTAPENEM SODIUM 1 G/1
1000 INJECTION, POWDER, LYOPHILIZED, FOR SOLUTION INTRAMUSCULAR; INTRAVENOUS EVERY 24 HOURS
Refills: 0 | Status: DISCONTINUED | OUTPATIENT
Start: 2021-12-11 | End: 2021-12-12

## 2021-12-10 RX ORDER — ACETAMINOPHEN 500 MG
975 TABLET ORAL EVERY 6 HOURS
Refills: 0 | Status: DISCONTINUED | OUTPATIENT
Start: 2021-12-10 | End: 2021-12-12

## 2021-12-10 RX ORDER — IBUPROFEN 200 MG
600 TABLET ORAL EVERY 6 HOURS
Refills: 0 | Status: DISCONTINUED | OUTPATIENT
Start: 2021-12-10 | End: 2021-12-12

## 2021-12-10 RX ORDER — ERTAPENEM SODIUM 1 G/1
INJECTION, POWDER, LYOPHILIZED, FOR SOLUTION INTRAMUSCULAR; INTRAVENOUS
Refills: 0 | Status: DISCONTINUED | OUTPATIENT
Start: 2021-12-10 | End: 2021-12-10

## 2021-12-10 RX ADMIN — Medication 975 MILLIGRAM(S): at 05:45

## 2021-12-10 RX ADMIN — Medication 975 MILLIGRAM(S): at 15:02

## 2021-12-10 RX ADMIN — Medication 975 MILLIGRAM(S): at 22:36

## 2021-12-10 RX ADMIN — Medication 975 MILLIGRAM(S): at 14:32

## 2021-12-10 RX ADMIN — Medication 975 MILLIGRAM(S): at 22:06

## 2021-12-10 RX ADMIN — ERTAPENEM SODIUM 120 MILLIGRAM(S): 1 INJECTION, POWDER, LYOPHILIZED, FOR SOLUTION INTRAMUSCULAR; INTRAVENOUS at 00:18

## 2021-12-10 NOTE — H&P ADULT - ASSESSMENT
26 y.o.  LMP 08/15 presenting to the ED with heavy vaginal bleeding + abdominal pain x 1 day being admitted to GYN for endometritis. On arrival to the ED patient noted to have temperature of 37.9 and was tachycardic to the 110s. She was examined by ED provider who removed tissue from the vaginal vault prior to her ultrasound. Due to clinical exam and vital signs (tachycardia), concern for endometritis following recent termination.  26 y.o.  LMP 08/15 presenting to the ED with heavy vaginal bleeding + abdominal pain x 1 day being admitted to GYN for endometritis. On arrival to the ED patient noted to have temperature of 37.9 and was tachycardic to the 110s. She was examined by ED provider who removed tissue from the vaginal vault prior to her ultrasound which was brought to pathology. Due to clinical exam and vital signs (tachycardia), concern for endometritis following recent termination.

## 2021-12-10 NOTE — PROGRESS NOTE ADULT - SUBJECTIVE AND OBJECTIVE BOX
25 y/o s/p eTOP at 15wks on 12/4/21, admitted w suspected endometritis HD#2  pt today reports feeling better  lower abdominal pain in much improved  bleeding in lighter  received dose of Invanz    vitals  /61 P101 RR 16 T 98.7 O2sat 100%RA  abdomen- non distended, non tender, no rebound or guarding    CBC  wbc 6, h/h 8.4/27, ptl 123    A/P  25 y/o s/p eTOP admitted w endometritis improving  cont invanz for an additional dose  cont to monitor vaginal bleeding  PO pain mgnt prn  ambulation encourage  if remained stable/continues to improve may be discharge tomorrow Sat. 12/11/21

## 2021-12-10 NOTE — PROVIDER CONTACT NOTE (OTHER) - ASSESSMENT
Pt came in complaining of heavy vaginal bleeding/fever. Changing pads at home every 40mins. Tachy 115 Afebrile Pt denies headache, visual change or dizziness.

## 2021-12-10 NOTE — H&P ADULT - HISTORY OF PRESENT ILLNESS
PAT CROCKETT  26y  Female [id number]    HPI: 26 y.o.  LMP 08/15 presenting to the ED with heavy vaginal bleeding + abdominal pain x 1 day being admitted to GYN for endometritis. Patient reports she went to [facility]  for a termination at 15w s/p D&E. She reports she had minimal bleeding up until one day prior where she began to change her pad once every 40 min to an hour due to heavy bleeding. She also reports passing clots and had heavy cramping associated with it that was 10/10 pain. She tried taking Tylenol with minimal relief and persisting symptoms brought her to the emergency department. She denies feeling fevers, chills, NVD. On arrival to the ED patient noted to have temperature of 37.9 and was tachycardic to the 110s. She was examined by ED provider who removed tissue from the vaginal vault prior to her ultrasound.    Ob/Gyn Physician: Tamara      OBHx:   -  FT  -IUFD@40wk c/b sPEC/Mg  -ETOP @6wk s/p D&C  2018#   2019#3    GynHx: denies   PMH: ROLANDO s/p Fe transfusions   PSH: D&C, L breast Bx in   Psych: denies  Social: denies   Meds: PNV   Allergies: NKDA  FMHx:  Family history of hypertension (Father)  Family history of sarcoidosis (Father)  Family history of anemia (Sibling)  Iron deficiency PAT CROCKETT  26y  Female [id number]    HPI: 26 y.o.  LMP 08/15 presenting to the ED with heavy vaginal bleeding + abdominal pain x 1 day being admitted to GYN for endometritis. Patient reports she went to [facility]  for a termination at 15w s/p D&E. She reports she had minimal bleeding up until one day prior where she began to change her pad once every 40 min to an hour due to heavy bleeding. She also reports passing clots and had heavy cramping associated with it that was 10/10 pain. She tried taking Tylenol with minimal relief and persisting symptoms brought her to the emergency department. She denies feeling fevers, chills, NVD. On arrival to the ED patient noted to have temperature of 37.9 and was tachycardic to the 110s. She was examined by ED provider who removed tissue from the vaginal vault prior to her ultrasound.    Ob/Gyn Physician: Tamara      OBHx:   -  FT  -IUFD@40wk c/b sPEC/Mg  -ETOP @6wk s/p D&C  2018#   2019#3    GynHx: denies   PMH: ROLANDO s/p Fe transfusions   PSH: D&C, L breast Bx in   Psych: denies  Social: denies   Meds: N/A   Allergies: NKDA  FMHx:  Family history of hypertension (Father)  Family history of sarcoidosis (Father)  Family history of anemia (Sibling)  Iron deficiency

## 2021-12-10 NOTE — DISCHARGE NOTE PROVIDER - HOSPITAL COURSE
25yo  presented to [facility] ED on  with heavy vaginal bleeding and abdominal pain post-operative day 5 from a dilation and evacuation for termination of pregnancy at 15w. She was admitted to GYN with concerns for endometritis in the setting tachycardia and uterine tenderness, and was started on Invanz (12/10). On day of discharge patient was afebrile for over 24 hours, ambulating without difficulty, pain well controlled on oral medications, adequate oral intake, and voiding freely.  Discharge instructions and precautions were given.  Will have close follow up with Dr. Mcdonald in 2 weeks.   27yo  presented to [facility] ED on  with heavy vaginal bleeding and abdominal pain post-operative day 5 from a dilation and evacuation for termination of pregnancy at 15w. She was admitted to GYN with concerns for endometritis in the setting tachycardia and uterine tenderness, and was started on Invanz (12/10-). On day of discharge patient was afebrile for over 24 hours, ambulating without difficulty, pain well controlled on oral medications, adequate oral intake, and voiding freely.  Discharge instructions and precautions were given.  Patient will go home on a 7 day course of Augmentin. Will have close follow up with Dr. Mcdonald in 1 week.

## 2021-12-10 NOTE — PATIENT PROFILE ADULT - FALL HARM RISK - HARM RISK INTERVENTIONS
Assistance with ambulation/Assistance OOB with selected safe patient handling equipment/Communicate Risk of Fall with Harm to all staff/Monitor gait and stability/Reinforce activity limits and safety measures with patient and family/Sit up slowly, dangle for a short time, stand at bedside before walking/Tailored Fall Risk Interventions/Visual Cue: Yellow wristband and red socks/Bed in lowest position, wheels locked, appropriate side rails in place/Call bell, personal items and telephone in reach/Instruct patient to call for assistance before getting out of bed or chair/Non-slip footwear when patient is out of bed/Falls to call system/Physically safe environment - no spills, clutter or unnecessary equipment/Purposeful Proactive Rounding/Room/bathroom lighting operational, light cord in reach

## 2021-12-10 NOTE — H&P ADULT - NSHPPHYSICALEXAM_GEN_ALL_CORE
Vital Signs Last 24 Hrs  T(C): 37.9 (09 Dec 2021 17:41), Max: 37.9 (09 Dec 2021 17:41)  T(F): 100.3 (09 Dec 2021 17:41), Max: 100.3 (09 Dec 2021 17:41)  HR: 110 (09 Dec 2021 17:41) (110 - 110)  BP: 146/68 (09 Dec 2021 17:41) (146/68 - 146/68)  BP(mean): --  RR: 18 (09 Dec 2021 17:41) (18 - 18)  SpO2: 99% (09 Dec 2021 17:41) (99% - 99%)    Physical Exam:   General: sitting comfortably in bed, NAD   CV: RR S1S2 no m/r/g  Lungs: CTA b/l, unlabored on RA  Back: No CVA tenderness  Abd: Soft, non-tender, non-distended,  +BS  :  No bleeding on pad.  External labia wnl.   Speculum Exam: minimal, < 10cc dark red blood in vaginal vault.  Bimanual exam with no cervical motion tenderness, + uterine tenderness, adnexa non palpable b/l.  Cervix closed. Exam chaperoned by Luisa ultrasound tech  Ext: non-tender b/l, no edema

## 2021-12-10 NOTE — DISCHARGE NOTE PROVIDER - NSDCFUADDINST_GEN_ALL_CORE_FT
Regular diet as tolerated, regular activity as tolerated, no heavy lifting for first two weeks.  Nothing per vagina: no intercourse, tampons or douching.  Call your provider if you experience fevers, chills, worsening abdominal pain, inability to urinate or worsening vaginal bleeding.  Follow up with your provider in 2 weeks.    Please take the following medications:    1. Ibuprofen 600mg - 1 tab every 6 hours around the clock for the first three days. Please take with food.   2. Tylenol 500mg - 2 tabs (total dose 1000) every 6 hours around the clock for the first three days.

## 2021-12-10 NOTE — H&P ADULT - PROBLEM SELECTOR PLAN 1
Neuro: PO pain meds   CV: Continue to monitor VS, AM labs  Pulm: Saturating well on room air, encourage oob/amb  GI:  Continue regular diet  : Voiding spontaneously  Heme: c/w HSQ for DVT ppx  FEN: tolerating po, replete electrolytes prn  ID: Afebrile, continue to monitor temperature. Invanz for endometritis (12/10-)   Endo: No active issues   Dispo: Admission for treatment of endometritis, monitor vaginal bleeding    d/w Dr. Julia Freitas PGY-2 Neuro: PO pain meds   CV: Continue to monitor VS, AM labs  Pulm: Saturating well on room air, encourage oob/amb  GI:  Continue regular diet  : Voiding spontaneously  Heme: HSQ for DVT ppx  FEN: tolerating po, replete electrolytes prn  ID: Afebrile, continue to monitor temperature. Invanz for endometritis (12/10-) F/u UCx, tissue pathology from ED (12/10)  Endo: No active issues   Dispo: Admission for treatment of endometritis, monitor vaginal bleeding     d/w Dr. Julia Freitas PGY-2

## 2021-12-10 NOTE — DISCHARGE NOTE PROVIDER - NSDCACTIVITY_GEN_ALL_CORE
No restrictions/Return to Work/School allowed/Driving allowed/Walking - Indoors allowed/Walking - Outdoors allowed

## 2021-12-10 NOTE — H&P ADULT - NSHPLABSRESULTS_GEN_ALL_CORE
LABS:                              9.5    9.10  )-----------( 141      ( 09 Dec 2021 20:34 )             30.1         135  |  101  |  9   ----------------------------<  85  3.5   |  21<L>  |  0.56    Ca    9.1      09 Dec 2021 20:34    TPro  7.0  /  Alb  3.5  /  TBili  0.5  /  DBili  x   /  AST  14  /  ALT  8   /  AlkPhos  82      I&O's Detail          RADIOLOGY & ADDITIONAL STUDIES:    < from: US Transvaginal (21 @ 21:53) >      ACC: [id number] EXAM:  US TRANSVAGINAL                          PROCEDURE DATE:  2021          INTERPRETATION:  CLINICAL INFORMATION: Lower abdominal pain with vaginal   bleeding status post elective  2021    COMPARISON: None    TECHNIQUE:  Endovaginal and transabdominal pelvic sonogram. Color and Spectral   Doppler was performed.    FINDINGS:    Uterus: 16.3 x 9.1 x 8.8 cm. Within normal limits.  Endometrium: 1.9 cm. There is a linear focus of highly echogenic material   with dirty posterior shadowing within the endometrium. No hyperemia.    Right ovary: 5.0 x 2.5 x 3.5 cm. Within normal limits. Normal arterial   and venous waveforms.  Left ovary: 4.8 x 2.6 x 3.6 cm. Within normal limits. Normal arterial and   venous waveforms.    Fluid: None.    IMPRESSION:    No explanation for abdominal pain on this pelvic ultrasound.  Echogenic endometrial foci likely represent air in the setting of recent   instrumentation.    --- End of Report ---          TRINI FELDER MD; Resident Radiologist  This document has been electronically signed.  JANY HERNANDEZ MD; Attending Radiologist  This document has been electronically signed. Dec  9 2021 10:50PM    < end of copied text >

## 2021-12-10 NOTE — PROGRESS NOTE ADULT - SUBJECTIVE AND OBJECTIVE BOX
R1 GYN Progress Note HD#1    S: Pt seen and examined at bedside in NAD. Denies any complaints, no acute events overnight.     O:   Vital Signs Last 24 Hrs  T(F): 98.7 (10 Dec 2021 04:45), Max: 100.3 (09 Dec 2021 17:41)  HR: 115 (10 Dec 2021 04:45) (110 - 117)  BP: 119/51 (10 Dec 2021 04:45) (113/50 - 146/68)  RR: 16 (10 Dec 2021 04:45) (16 - 18)  SpO2: 100% (10 Dec 2021 04:45) (99% - 100%)    Gen: alert, oriented  CVS: RRR  Lungs: CTAB  Abdomen: Soft, nontender, non distended. No rebound, rigidity or guarding.  : no active bleeding. Voiding spontaneously  Ext: No calf tenderness    MEDICATIONS  (STANDING):    MEDICATIONS  (PRN):  acetaminophen     Tablet .. 975 milliGRAM(s) Oral every 6 hours PRN Temp greater or equal to 38C (100.4F), Mild Pain (1 - 3)  ibuprofen  Tablet. 600 milliGRAM(s) Oral every 6 hours PRN Moderate Pain (4 - 6)      LABS:                        8.4    6.16  )-----------( 123      ( 10 Dec 2021 06:20 )             27.1                         9.5    9.10  )-----------( 141      ( 09 Dec 2021 20:34 )             30.1     Magnesium, Serum: 1.70 mg/dL (12-10 @ 06:20)  ABO Interpretation: O (12-09 @ 20:36)  Rh Interpretation: Positive (12-09 @ 20:36)  Antibody Screen: Negative (12-09 @ 20:36)    12-10-21 @ 06:20      133<L>  |  102  |  8   ----------------------------<  81  3.0<L>   |  19<L>  |  0.60    12-09-21 @ 20:34      135  |  101  |  9   ----------------------------<  85  3.5   |  21<L>  |  0.56        Ca    8.3<L>      10 Dec 2021 06:20  Ca    9.1      09 Dec 2021 20:34  Phos  2.9     12-10  Mg     1.70     12-10    TPro  7.0  /  Alb  3.5  /  TBili  0.5  /  DBili  x   /  AST  14  /  ALT  8   /  AlkPhos  82  12-09-21 @ 20:34              12-09-21 @ 07:01  -  12-10-21 @ 07:00  --------------------------------------------------------  IN: 0 mL / OUT: 0 mL / NET: 0 mL     R1 GYN Progress Note HD#1    S: Pt seen and examined at bedside in NAD. Denies any complaints, no acute events overnight.     O:   Vital Signs Last 24 Hrs  T(F): 98.7 (10 Dec 2021 04:45), Max: 100.3 (09 Dec 2021 17:41)  HR: 115 (10 Dec 2021 04:45) (110 - 117)  BP: 119/51 (10 Dec 2021 04:45) (113/50 - 146/68)  RR: 16 (10 Dec 2021 04:45) (16 - 18)  SpO2: 100% (10 Dec 2021 04:45) (99% - 100%)    Gen: alert, oriented  CVS: RRR  Lungs: CTAB  Abdomen: Soft, mildly tender uterus. No rebound, rigidity or guarding.  : no active bleeding. Voiding spontaneously  Ext: No calf tenderness    MEDICATIONS  (STANDING):    MEDICATIONS  (PRN):  acetaminophen     Tablet .. 975 milliGRAM(s) Oral every 6 hours PRN Temp greater or equal to 38C (100.4F), Mild Pain (1 - 3)  ibuprofen  Tablet. 600 milliGRAM(s) Oral every 6 hours PRN Moderate Pain (4 - 6)      LABS:                        8.4    6.16  )-----------( 123      ( 10 Dec 2021 06:20 )             27.1                         9.5    9.10  )-----------( 141      ( 09 Dec 2021 20:34 )             30.1     Magnesium, Serum: 1.70 mg/dL (12-10 @ 06:20)  ABO Interpretation: O (12-09 @ 20:36)  Rh Interpretation: Positive (12-09 @ 20:36)  Antibody Screen: Negative (12-09 @ 20:36)    12-10-21 @ 06:20      133<L>  |  102  |  8   ----------------------------<  81  3.0<L>   |  19<L>  |  0.60    12-09-21 @ 20:34      135  |  101  |  9   ----------------------------<  85  3.5   |  21<L>  |  0.56        Ca    8.3<L>      10 Dec 2021 06:20  Ca    9.1      09 Dec 2021 20:34  Phos  2.9     12-10  Mg     1.70     12-10    TPro  7.0  /  Alb  3.5  /  TBili  0.5  /  DBili  x   /  AST  14  /  ALT  8   /  AlkPhos  82  12-09-21 @ 20:34              12-09-21 @ 07:01  -  12-10-21 @ 07:00  --------------------------------------------------------  IN: 0 mL / OUT: 0 mL / NET: 0 mL     R1 GYN Progress Note POD#6 HD#1    S: Pt seen and examined at bedside in NAD. Denies any complaints, no acute events overnight.     O:   Vital Signs Last 24 Hrs  T(F): 98.7 (10 Dec 2021 04:45), Max: 100.3 (09 Dec 2021 17:41)  HR: 115 (10 Dec 2021 04:45) (110 - 117)  BP: 119/51 (10 Dec 2021 04:45) (113/50 - 146/68)  RR: 16 (10 Dec 2021 04:45) (16 - 18)  SpO2: 100% (10 Dec 2021 04:45) (99% - 100%)    Gen: alert, oriented  CVS: RRR  Lungs: CTAB  Abdomen: Soft, mildly tender uterus. No rebound, rigidity or guarding.  : no active bleeding. Voiding spontaneously  Ext: No calf tenderness    MEDICATIONS  (STANDING):    MEDICATIONS  (PRN):  acetaminophen     Tablet .. 975 milliGRAM(s) Oral every 6 hours PRN Temp greater or equal to 38C (100.4F), Mild Pain (1 - 3)  ibuprofen  Tablet. 600 milliGRAM(s) Oral every 6 hours PRN Moderate Pain (4 - 6)      LABS:                        8.4    6.16  )-----------( 123      ( 10 Dec 2021 06:20 )             27.1                         9.5    9.10  )-----------( 141      ( 09 Dec 2021 20:34 )             30.1     Magnesium, Serum: 1.70 mg/dL (12-10 @ 06:20)  ABO Interpretation: O (12-09 @ 20:36)  Rh Interpretation: Positive (12-09 @ 20:36)  Antibody Screen: Negative (12-09 @ 20:36)    12-10-21 @ 06:20      133<L>  |  102  |  8   ----------------------------<  81  3.0<L>   |  19<L>  |  0.60    12-09-21 @ 20:34      135  |  101  |  9   ----------------------------<  85  3.5   |  21<L>  |  0.56        Ca    8.3<L>      10 Dec 2021 06:20  Ca    9.1      09 Dec 2021 20:34  Phos  2.9     12-10  Mg     1.70     12-10    TPro  7.0  /  Alb  3.5  /  TBili  0.5  /  DBili  x   /  AST  14  /  ALT  8   /  AlkPhos  82  12-09-21 @ 20:34              12-09-21 @ 07:01  -  12-10-21 @ 07:00  --------------------------------------------------------  IN: 0 mL / OUT: 0 mL / NET: 0 mL

## 2021-12-10 NOTE — PROGRESS NOTE ADULT - ASSESSMENT
A/P: 26 y.o.  POD#5 from D&E@15w at [facility] presented to [facility] ED with VB concerning for endometritis.    Neuro: Pain well controlled. PO pain meds (tylenol, ibuprofen PRN).  CV: Hemodynamically stable, AM labs pending  Pulm: Saturating well on room air, encourage oob/amb, encourage use of incentive spirometry  GI: Continue regular diet  : Voiding spontaneously with minimal vaginal bleeding, continue to monitor UO.  Heme: ambulation and SCDs for DVT ppx  FEN: SLIV. replete electrolytes prn   ID: Afebrile overnight. Patient has been started on Invanz (12/10- ) for endometritis  Endo: No active issues   Dispo: Discharge planning      Amyeo Afroz Jereen, PGY-1     A/P: 26 y.o.  POD#5 from D&E@15w at [facility] presented to [facility] ED with VB concerning for endometritis. On arrival to the ED patient noted to have temperature of 37.9 and was tachycardic to the 110s. She was examined by ED provider who removed foul smelling tissue from the vaginal vault prior to her ultrasound. Patient was started on Invanz (12/10- ) for endometritis. Patient is currently stable, doing well.     Neuro: Pain well controlled. PO pain meds (Tylenol, ibuprofen PRN).  CV: Hemodynamically stable, AM labs pending  Pulm: Saturating well on room air, encourage oob/amb, encourage use of incentive spirometry  GI: Continue regular diet  : Voiding spontaneously with minimal vaginal bleeding, continue to monitor UO.  Heme: ambulation and SCDs for DVT ppx  FEN: SLIV. replete electrolytes prn   ID: Patient has been started on Invanz (12/10- ) for endometritis. Continues to be afebrile with tachycardia with uterine tenderness.   Endo: No active issues   Dispo: Discharge planning    Amyeo Afroz Jereen, PGY-1     A/P: 26 y.o.  POD#6 from D&E@15w at [facility] presented to [facility] ED with VB concerning for endometritis. On arrival to the ED patient noted to have temperature of 37.9 and was tachycardic to the 110s. She was examined by ED provider who removed foul smelling tissue from the vaginal vault prior to her ultrasound. Patient was started on Invanz (12/10- ) for endometritis. Patient is currently stable, doing well.     Neuro: Pain well controlled. PO pain meds (Tylenol, ibuprofen PRN).  CV: Hemodynamically stable, AM labs pending  Pulm: Saturating well on room air, encourage oob/amb, encourage use of incentive spirometry  GI: Continue regular diet  : Voiding spontaneously with minimal vaginal bleeding, continue to monitor UO.  Heme: ambulation and SCDs for DVT ppx  FEN: SLIV. replete electrolytes prn   ID: Patient has been started on Invanz (12/10- ) for endometritis. Continues to be afebrile with tachycardia with uterine tenderness.   Endo: No active issues   Dispo: Discharge planning    Amyeo Afroz Jereen, PGY-1     A/P: 26 y.o.  POD#6 from D&E@15w at [facility] presented to [facility] ED with VB concerning for endometritis. On arrival to the ED patient noted to have temperature of 37.9 and was tachycardic to the 110s. She was examined by ED provider who removed foul smelling tissue from the vaginal vault prior to her ultrasound. Patient was started on Invanz (12/10- ) for endometritis. Patient is currently stable, doing well.     Neuro: Pain well controlled. PO pain meds (Tylenol, ibuprofen PRN).  CV: Hemodynamically stable, AM labs pending  Pulm: Saturating well on room air, encourage oob/amb, encourage use of incentive spirometry  GI: Continue regular diet  : Voiding spontaneously with minimal vaginal bleeding.  Heme: ambulation and SCDs for DVT ppx  FEN: SLIV. replete electrolytes prn   ID: Continues to be afebrile with tachycardia with uterine tenderness. Continue Invanz (12/10- ) for endometritis.   Endo: No active issues   Dispo: Discharge planning    Amyeo Afroz Jereen, PGY-1

## 2021-12-10 NOTE — DISCHARGE NOTE PROVIDER - NSDCCPCAREPLAN_GEN_ALL_CORE_FT
PRINCIPAL DISCHARGE DIAGNOSIS  Diagnosis: Vaginal bleeding  Assessment and Plan of Treatment:       SECONDARY DISCHARGE DIAGNOSES  Diagnosis: Fever  Assessment and Plan of Treatment:

## 2021-12-11 LAB
APPEARANCE UR: ABNORMAL
BACTERIA # UR AUTO: ABNORMAL
BASOPHILS # BLD AUTO: 0.02 K/UL — SIGNIFICANT CHANGE UP (ref 0–0.2)
BASOPHILS NFR BLD AUTO: 0.4 % — SIGNIFICANT CHANGE UP (ref 0–2)
BILIRUB UR-MCNC: NEGATIVE — SIGNIFICANT CHANGE UP
COLOR SPEC: ABNORMAL
COMMENT - URINE: SIGNIFICANT CHANGE UP
DIFF PNL FLD: ABNORMAL
EOSINOPHIL # BLD AUTO: 0.13 K/UL — SIGNIFICANT CHANGE UP (ref 0–0.5)
EOSINOPHIL NFR BLD AUTO: 2.3 % — SIGNIFICANT CHANGE UP (ref 0–6)
EPI CELLS # UR: SIGNIFICANT CHANGE UP
GLUCOSE UR QL: NEGATIVE — SIGNIFICANT CHANGE UP
HCT VFR BLD CALC: 27.5 % — LOW (ref 34.5–45)
HGB BLD-MCNC: 8.9 G/DL — LOW (ref 11.5–15.5)
IANC: 3.93 K/UL — SIGNIFICANT CHANGE UP (ref 1.5–8.5)
IMM GRANULOCYTES NFR BLD AUTO: 1.2 % — SIGNIFICANT CHANGE UP (ref 0–1.5)
KETONES UR-MCNC: ABNORMAL
LEUKOCYTE ESTERASE UR-ACNC: ABNORMAL
LYMPHOCYTES # BLD AUTO: 1.05 K/UL — SIGNIFICANT CHANGE UP (ref 1–3.3)
LYMPHOCYTES # BLD AUTO: 18.7 % — SIGNIFICANT CHANGE UP (ref 13–44)
MCHC RBC-ENTMCNC: 26.5 PG — LOW (ref 27–34)
MCHC RBC-ENTMCNC: 32.4 GM/DL — SIGNIFICANT CHANGE UP (ref 32–36)
MCV RBC AUTO: 81.8 FL — SIGNIFICANT CHANGE UP (ref 80–100)
MONOCYTES # BLD AUTO: 0.42 K/UL — SIGNIFICANT CHANGE UP (ref 0–0.9)
MONOCYTES NFR BLD AUTO: 7.5 % — SIGNIFICANT CHANGE UP (ref 2–14)
NEUTROPHILS # BLD AUTO: 3.93 K/UL — SIGNIFICANT CHANGE UP (ref 1.8–7.4)
NEUTROPHILS NFR BLD AUTO: 69.9 % — SIGNIFICANT CHANGE UP (ref 43–77)
NITRITE UR-MCNC: NEGATIVE — SIGNIFICANT CHANGE UP
NRBC # BLD: 0 /100 WBCS — SIGNIFICANT CHANGE UP
NRBC # FLD: 0 K/UL — SIGNIFICANT CHANGE UP
PH UR: 6.5 — SIGNIFICANT CHANGE UP (ref 5–8)
PLATELET # BLD AUTO: 142 K/UL — LOW (ref 150–400)
PROT UR-MCNC: ABNORMAL
RBC # BLD: 3.36 M/UL — LOW (ref 3.8–5.2)
RBC # FLD: 13.5 % — SIGNIFICANT CHANGE UP (ref 10.3–14.5)
RBC CASTS # UR COMP ASSIST: >50 /HPF — SIGNIFICANT CHANGE UP (ref 0–4)
SP GR SPEC: 1.01 — SIGNIFICANT CHANGE UP (ref 1–1.05)
UROBILINOGEN FLD QL: SIGNIFICANT CHANGE UP
WBC # BLD: 5.62 K/UL — SIGNIFICANT CHANGE UP (ref 3.8–10.5)
WBC # FLD AUTO: 5.62 K/UL — SIGNIFICANT CHANGE UP (ref 3.8–10.5)
WBC UR QL: >50 /HPF — SIGNIFICANT CHANGE UP (ref 0–5)

## 2021-12-11 RX ORDER — SIMETHICONE 80 MG/1
80 TABLET, CHEWABLE ORAL DAILY
Refills: 0 | Status: DISCONTINUED | OUTPATIENT
Start: 2021-12-11 | End: 2021-12-12

## 2021-12-11 RX ADMIN — SIMETHICONE 80 MILLIGRAM(S): 80 TABLET, CHEWABLE ORAL at 11:48

## 2021-12-11 RX ADMIN — ERTAPENEM SODIUM 120 MILLIGRAM(S): 1 INJECTION, POWDER, LYOPHILIZED, FOR SOLUTION INTRAMUSCULAR; INTRAVENOUS at 23:18

## 2021-12-11 RX ADMIN — Medication 975 MILLIGRAM(S): at 05:23

## 2021-12-11 RX ADMIN — Medication 975 MILLIGRAM(S): at 17:33

## 2021-12-11 RX ADMIN — Medication 975 MILLIGRAM(S): at 23:18

## 2021-12-11 RX ADMIN — Medication 975 MILLIGRAM(S): at 16:36

## 2021-12-11 RX ADMIN — Medication 600 MILLIGRAM(S): at 04:37

## 2021-12-11 RX ADMIN — ERTAPENEM SODIUM 120 MILLIGRAM(S): 1 INJECTION, POWDER, LYOPHILIZED, FOR SOLUTION INTRAMUSCULAR; INTRAVENOUS at 01:19

## 2021-12-11 RX ADMIN — Medication 975 MILLIGRAM(S): at 04:53

## 2021-12-11 RX ADMIN — Medication 975 MILLIGRAM(S): at 23:48

## 2021-12-11 RX ADMIN — Medication 600 MILLIGRAM(S): at 05:07

## 2021-12-11 NOTE — PROGRESS NOTE ADULT - ASSESSMENT
A/P: 26 y.o.  POD#7 from D&E@15w at [facility] presented to [facility] ED with VB concerning for endometritis. On arrival to the ED patient noted to have temperature of 37.9 and was tachycardic to the 110s. She was examined by ED provider who removed foul smelling tissue from the vaginal vault prior to her ultrasound. Patient was started on Invanz (12/10- ) for endometritis. Patient is currently stable, doing well.     Neuro: Pain well controlled. PO pain meds (Tylenol, ibuprofen PRN).  CV: Hemodynamically stable, AM labs pending  Pulm: Saturating well on room air, encourage oob/amb, encourage use of incentive spirometry  GI: Continue regular diet  : Voiding spontaneously with minimal vaginal bleeding.  Heme: ambulation and SCDs for DVT ppx  FEN: SLIV. replete electrolytes prn   ID: Febrile 38.2 (12/10) at 9pm, with tachycardia with uterine tenderness. Continue Invanz (12/10- ) for endometritis.   Endo: No active issues   Dispo: Discharge planning      Haleigh Truong PGY2

## 2021-12-11 NOTE — PROGRESS NOTE ADULT - SUBJECTIVE AND OBJECTIVE BOX
LIDA BAXTER Progress Note  POD#7/HD#2    Patient seen and examined at bedside.  No acute events overnight. No acute complaints.  Pain well controlled. Patient is ambulating and tolerating regular diet. Patient is passing flatus.  Patient is voiding spontaneously. Denies CP, SOB, N/V, fevers, and chills.    Vital Signs Last 24 Hours  T(C): 36.8 (12-11-21 @ 01:44), Max: 38.2 (12-10-21 @ 21:12)  HR: 98 (12-11-21 @ 01:44) (97 - 107)  BP: 113/64 (12-11-21 @ 01:44) (113/64 - 125/76)  RR: 17 (12-11-21 @ 01:44) (16 - 17)  SpO2: 100% (12-11-21 @ 01:44) (100% - 100%)    I&O's Summary    09 Dec 2021 07:01  -  10 Dec 2021 07:00  --------------------------------------------------------  IN: 0 mL / OUT: 0 mL / NET: 0 mL    10 Dec 2021 07:01  -  11 Dec 2021 05:17  --------------------------------------------------------  IN: 720 mL / OUT: 600 mL / NET: 120 mL        Physical Exam:  Gen: alert, oriented  CVS: RRR  Lungs: CTAB  Abdomen: Soft, mildly tender uterus. No rebound, rigidity or guarding.  : no active bleeding. Voiding spontaneously  Ext: No calf tenderness      Labs:                        8.4    6.16  )-----------( 123      ( 10 Dec 2021 06:20 )             27.1   baso 0.3    eos 0.6    imm gran 0.8    lymph 13.3   mono 5.4    poly 79.6                         9.5    9.10  )-----------( 141      ( 09 Dec 2021 20:34 )             30.1   baso 0.1    eos 0.4    imm gran 0.7    lymph 9.1    mono 4.8    poly 84.9       MEDICATIONS  (STANDING):  ertapenem  IVPB 1000 milliGRAM(s) IV Intermittent every 24 hours    MEDICATIONS  (PRN):  acetaminophen     Tablet .. 975 milliGRAM(s) Oral every 6 hours PRN Temp greater or equal to 38C (100.4F), Mild Pain (1 - 3)  ibuprofen  Tablet. 600 milliGRAM(s) Oral every 6 hours PRN Moderate Pain (4 - 6)         LIDA BAXTER Progress Note  POD#7/HD#2    Patient seen and examined at bedside.  No acute events overnight. No acute complaints. Pt reports RLQ cramping pain that is improved with Tylenol and Motrin. Patient is ambulating and tolerating regular diet. Patient is passing flatus.  Patient is voiding spontaneously. Denies CP, SOB, N/V, fevers, and chills.    Vital Signs Last 24 Hours  T(C): 36.8 (12-11-21 @ 01:44), Max: 38.2 (12-10-21 @ 21:12)  HR: 98 (12-11-21 @ 01:44) (97 - 107)  BP: 113/64 (12-11-21 @ 01:44) (113/64 - 125/76)  RR: 17 (12-11-21 @ 01:44) (16 - 17)  SpO2: 100% (12-11-21 @ 01:44) (100% - 100%)    I&O's Summary    09 Dec 2021 07:01  -  10 Dec 2021 07:00  --------------------------------------------------------  IN: 0 mL / OUT: 0 mL / NET: 0 mL    10 Dec 2021 07:01  -  11 Dec 2021 05:17  --------------------------------------------------------  IN: 720 mL / OUT: 600 mL / NET: 120 mL        Physical Exam:  Gen: alert, oriented  CVS: RRR  Lungs: CTAB  Abdomen: Soft, mildly tender uterus. No rebound, rigidity or guarding.  : no active bleeding. Voiding spontaneously  Ext: No calf tenderness      Labs:                        8.4    6.16  )-----------( 123      ( 10 Dec 2021 06:20 )             27.1   baso 0.3    eos 0.6    imm gran 0.8    lymph 13.3   mono 5.4    poly 79.6                         9.5    9.10  )-----------( 141      ( 09 Dec 2021 20:34 )             30.1   baso 0.1    eos 0.4    imm gran 0.7    lymph 9.1    mono 4.8    poly 84.9       MEDICATIONS  (STANDING):  ertapenem  IVPB 1000 milliGRAM(s) IV Intermittent every 24 hours    MEDICATIONS  (PRN):  acetaminophen     Tablet .. 975 milliGRAM(s) Oral every 6 hours PRN Temp greater or equal to 38C (100.4F), Mild Pain (1 - 3)  ibuprofen  Tablet. 600 milliGRAM(s) Oral every 6 hours PRN Moderate Pain (4 - 6)

## 2021-12-12 ENCOUNTER — TRANSCRIPTION ENCOUNTER (OUTPATIENT)
Age: 26
End: 2021-12-12

## 2021-12-12 VITALS
HEART RATE: 82 BPM | SYSTOLIC BLOOD PRESSURE: 109 MMHG | OXYGEN SATURATION: 99 % | TEMPERATURE: 98 F | DIASTOLIC BLOOD PRESSURE: 63 MMHG | RESPIRATION RATE: 18 BRPM

## 2021-12-12 LAB
BASOPHILS # BLD AUTO: 0.02 K/UL — SIGNIFICANT CHANGE UP (ref 0–0.2)
BASOPHILS NFR BLD AUTO: 0.3 % — SIGNIFICANT CHANGE UP (ref 0–2)
CULTURE RESULTS: SIGNIFICANT CHANGE UP
EOSINOPHIL # BLD AUTO: 0.24 K/UL — SIGNIFICANT CHANGE UP (ref 0–0.5)
EOSINOPHIL NFR BLD AUTO: 3.6 % — SIGNIFICANT CHANGE UP (ref 0–6)
HCT VFR BLD CALC: 26.5 % — LOW (ref 34.5–45)
HGB BLD-MCNC: 8.2 G/DL — LOW (ref 11.5–15.5)
IANC: 3.59 K/UL — SIGNIFICANT CHANGE UP (ref 1.5–8.5)
IMM GRANULOCYTES NFR BLD AUTO: 2 % — HIGH (ref 0–1.5)
LYMPHOCYTES # BLD AUTO: 1.98 K/UL — SIGNIFICANT CHANGE UP (ref 1–3.3)
LYMPHOCYTES # BLD AUTO: 29.7 % — SIGNIFICANT CHANGE UP (ref 13–44)
MCHC RBC-ENTMCNC: 25.2 PG — LOW (ref 27–34)
MCHC RBC-ENTMCNC: 30.9 GM/DL — LOW (ref 32–36)
MCV RBC AUTO: 81.5 FL — SIGNIFICANT CHANGE UP (ref 80–100)
MONOCYTES # BLD AUTO: 0.7 K/UL — SIGNIFICANT CHANGE UP (ref 0–0.9)
MONOCYTES NFR BLD AUTO: 10.5 % — SIGNIFICANT CHANGE UP (ref 2–14)
NEUTROPHILS # BLD AUTO: 3.59 K/UL — SIGNIFICANT CHANGE UP (ref 1.8–7.4)
NEUTROPHILS NFR BLD AUTO: 53.9 % — SIGNIFICANT CHANGE UP (ref 43–77)
NRBC # BLD: 0 /100 WBCS — SIGNIFICANT CHANGE UP
NRBC # FLD: 0 K/UL — SIGNIFICANT CHANGE UP
PLATELET # BLD AUTO: 154 K/UL — SIGNIFICANT CHANGE UP (ref 150–400)
RBC # BLD: 3.25 M/UL — LOW (ref 3.8–5.2)
RBC # FLD: 13.4 % — SIGNIFICANT CHANGE UP (ref 10.3–14.5)
SPECIMEN SOURCE: SIGNIFICANT CHANGE UP
WBC # BLD: 6.66 K/UL — SIGNIFICANT CHANGE UP (ref 3.8–10.5)
WBC # FLD AUTO: 6.66 K/UL — SIGNIFICANT CHANGE UP (ref 3.8–10.5)

## 2021-12-12 RX ADMIN — SIMETHICONE 80 MILLIGRAM(S): 80 TABLET, CHEWABLE ORAL at 11:31

## 2021-12-12 NOTE — DISCHARGE NOTE NURSING/CASE MANAGEMENT/SOCIAL WORK - NSDCPEFALRISK_GEN_ALL_CORE
For information on Fall & Injury Prevention, visit: [email or web address] OR  [email or web address] OR  https://www.cdc.gov/steadi/patient.html

## 2021-12-12 NOTE — DISCHARGE NOTE NURSING/CASE MANAGEMENT/SOCIAL WORK - PATIENT PORTAL LINK FT
You can access the FollowMyHealth Patient Portal offered by [facility] by registering at the following website: [email or web address]. By joining [employer]’s FollowMyHealth portal, you will also be able to view your health information using other applications (apps) compatible with our system.

## 2021-12-12 NOTE — PROGRESS NOTE ADULT - ASSESSMENT
A/P: 26 y.o.  POD#8 from D&E@15w at [facility] presented to [facility] ED with VB concerning for endometritis. On arrival to the ED patient noted to have temperature of 37.9 and was tachycardic to the 110s. She was examined by ED provider who removed foul smelling tissue from the vaginal vault prior to her ultrasound. Patient was started on Invanz (12/10- ) for endometritis. Patient is currently stable, doing well.     Neuro: Pain well controlled. PO pain meds (Tylenol, ibuprofen PRN).  CV: Hemodynamically stable, AM labs pending  Pulm: Saturating well on room air, encourage oob/amb, encourage use of incentive spirometry  GI: Continue regular diet  : Voiding spontaneously with minimal vaginal bleeding.  Heme: ambulation and SCDs for DVT ppx  FEN: SLIV. replete electrolytes prn   ID: Febrile 38.2 (12/10) at 9pm, with tachycardia with uterine tenderness improving. Afebrile since then. Continue Invanz (12/10- ) for endometritis.   Endo: No active issues   Dispo: Discharge planning      Haleigh Truong PGY2

## 2021-12-12 NOTE — PROGRESS NOTE ADULT - ATTENDING COMMENTS
agreed with resident note  patient doing well  afebrile >24 hrs  no fundal tenderness,no cva tenderness,abdomen soft no guarding no rebound  labs reviewed,blood culture negative  no elvated wbc  we will dc home on po antibiotics -to follow up with dr mcguire this week  precaution discussed
pt seen and evaluated  afebrile since spike last night  possible dc home in am if remains afebrile

## 2021-12-12 NOTE — PROGRESS NOTE ADULT - SUBJECTIVE AND OBJECTIVE BOX
LIDA BAXTER Progress Note  POD#8   HD#3    Patient seen and examined at bedside.  No acute events overnight. No acute complaints.  Pain well controlled. Patient is ambulating and tolerating regular diet.  Patient is passing flatus.  Patient is voiding spontaneously. Denies CP, SOB, N/V, fevers, and chills.    Vital Signs Last 24 Hours  T(C): 36.8 (12-12-21 @ 01:26), Max: 37.1 (12-11-21 @ 17:18)  HR: 83 (12-12-21 @ 01:26) (71 - 103)  BP: 110/65 (12-12-21 @ 01:26) (110/65 - 143/69)  RR: 17 (12-12-21 @ 01:26) (17 - 20)  SpO2: 100% (12-12-21 @ 01:26) (97% - 100%)    I&O's Summary    10 Dec 2021 07:01  -  11 Dec 2021 07:00  --------------------------------------------------------  IN: 720 mL / OUT: 600 mL / NET: 120 mL    11 Dec 2021 07:01  -  12 Dec 2021 05:25  --------------------------------------------------------  IN: 440 mL / OUT: 600 mL / NET: -160 mL        Physical Exam:  Gen: alert, oriented  CVS: RRR  Lungs: CTAB  Abdomen: Soft, mildly tender uterus. No rebound, rigidity or guarding.  : no active bleeding. Voiding spontaneously  Ext: No calf tenderness    Labs:                        8.9    5.62  )-----------( 142      ( 11 Dec 2021 07:27 )             27.5   baso 0.4    eos 2.3    imm gran 1.2    lymph 18.7   mono 7.5    poly 69.9                         8.4    6.16  )-----------( 123      ( 10 Dec 2021 06:20 )             27.1   baso 0.3    eos 0.6    imm gran 0.8    lymph 13.3   mono 5.4    poly 79.6                         9.5    9.10  )-----------( 141      ( 09 Dec 2021 20:34 )             30.1   baso 0.1    eos 0.4    imm gran 0.7    lymph 9.1    mono 4.8    poly 84.9       MEDICATIONS  (STANDING):  ertapenem  IVPB 1000 milliGRAM(s) IV Intermittent every 24 hours  simethicone 80 milliGRAM(s) Chew daily    MEDICATIONS  (PRN):  acetaminophen     Tablet .. 975 milliGRAM(s) Oral every 6 hours PRN Temp greater or equal to 38C (100.4F), Mild Pain (1 - 3)  ibuprofen  Tablet. 600 milliGRAM(s) Oral every 6 hours PRN Moderate Pain (4 - 6)

## 2021-12-16 LAB
CULTURE RESULTS: SIGNIFICANT CHANGE UP
CULTURE RESULTS: SIGNIFICANT CHANGE UP
SPECIMEN SOURCE: SIGNIFICANT CHANGE UP
SPECIMEN SOURCE: SIGNIFICANT CHANGE UP

## 2022-01-31 LAB — SURGICAL PATHOLOGY STUDY: SIGNIFICANT CHANGE UP

## 2022-03-03 NOTE — OB RN PATIENT PROFILE - IF RESULT GREATER THAN 35 DAYS OLD SELECT STATUS
Fine to change to Levemir at the same dosing, but he will run through this a lot faster as he takes a very high dose.   N/A

## 2022-04-25 NOTE — DISCHARGE NOTE OB - IF BREASTFEEDING, HAVE A GLASS OF WATER, JUICE, OR LOW-FAT MILK EACH TIME YOU FEED YOUR BABY
Statement Selected Winlevi Pregnancy And Lactation Text: This medication is considered safe during pregnancy and breastfeeding.

## 2022-07-20 NOTE — PATIENT PROFILE OB - PRO MENTAL HEALTH SX RECENT
The patient has been examined and the H&P has been reviewed:    I concur with the findings and no changes have occurred since H&P was written.    Surgery risks, benefits and alternative options discussed and understood by patient/family.          There are no hospital problems to display for this patient.    
none

## 2022-08-01 NOTE — DISCHARGE NOTE OB - AVOID PROLONGED STANDING
DATE OF PROCEDURE:  08/01/22      PREOPERATIVE DIAGNOSIS:  2 bothersome Pilar cysts    POSTOPERATIVE DIAGNOSIS:  Same    PROCEDURE:  Excision 14 mm Pilar cyst mid upper scalp                               Excision 11 mm Pilar cyst right lateral scalp    SURGEON:  Lencho Gentile MD    Anesthesia:  Local 1% lidocaine with epinephrine    INDICATION FOR PROCEDURE:  Patient presents with a couple Pilar cyst that she would like to have removed.  A local irritation at each site.  One is on the mid upper scalp and the other on the right lateral scalp.  Procedure, its indications, alternatives, risks and possible complications (including bleeding, infection, damage to internal structures and anesthetic complications) were explained to the patient and questions were answered.  Patient indicates understanding and consents.      Patient was placed in the supine position.  A small amount of hair was shaved over each site and silk tape was used to retract the hair away from each site.    Area was prepped and draped in the usual fashion.  We began by removing the 1 on the mid upper scalp.    Skin and surrounding subcutaneous tissue were infiltrated using 1% Xylocaine with epinephrine.     Incision was made over the lump.    Excision was carried to the subcutaneous tissue.  Blunt sharp dissection were used to completely remove the cyst intact which had a benign appearance.  Cautery was used for hemostasis.    Skin was reapproximated using a 3-0 chromic suture in an interrupted, inverted fashion.     Bacitracin was applied.  Both of the cyst had very benign appearance were thus sent together for path.    We then removed the scalp on the right lateral portion of the scalp.  It was excised and closed in the same fashion.      Patient was given wound care instructions.           Statement Selected

## 2022-11-30 NOTE — PROGRESS NOTE ADULT - ASSESSMENT
Ok to do both on same day.   25yo PPD#2 s/p , pregnancy c/b preeclampsia.  Patient is stable and doing well post-partum.

## 2023-01-10 ENCOUNTER — EMERGENCY (EMERGENCY)
Facility: HOSPITAL | Age: 28
LOS: 1 days | Discharge: HOME | End: 2023-01-10
Admitting: STUDENT IN AN ORGANIZED HEALTH CARE EDUCATION/TRAINING PROGRAM
Payer: MEDICAID

## 2023-01-10 VITALS
HEART RATE: 95 BPM | DIASTOLIC BLOOD PRESSURE: 81 MMHG | SYSTOLIC BLOOD PRESSURE: 123 MMHG | OXYGEN SATURATION: 100 % | RESPIRATION RATE: 18 BRPM | TEMPERATURE: 99 F

## 2023-01-10 VITALS
DIASTOLIC BLOOD PRESSURE: 72 MMHG | TEMPERATURE: 98 F | OXYGEN SATURATION: 100 % | RESPIRATION RATE: 20 BRPM | SYSTOLIC BLOOD PRESSURE: 127 MMHG | HEART RATE: 102 BPM

## 2023-01-10 DIAGNOSIS — O36.4XX0 MATERNAL CARE FOR INTRAUTERINE DEATH, NOT APPLICABLE OR UNSPECIFIED: Chronic | ICD-10-CM

## 2023-01-10 DIAGNOSIS — Z98.890 OTHER SPECIFIED POSTPROCEDURAL STATES: Chronic | ICD-10-CM

## 2023-01-10 LAB
ALBUMIN SERPL ELPH-MCNC: 3.8 G/DL — SIGNIFICANT CHANGE UP (ref 3.3–5)
ALP SERPL-CCNC: 89 U/L — SIGNIFICANT CHANGE UP (ref 40–120)
ALT FLD-CCNC: 10 U/L — SIGNIFICANT CHANGE UP (ref 4–33)
ANION GAP SERPL CALC-SCNC: 11 MMOL/L — SIGNIFICANT CHANGE UP (ref 7–14)
APPEARANCE UR: ABNORMAL
AST SERPL-CCNC: 16 U/L — SIGNIFICANT CHANGE UP (ref 4–32)
BACTERIA # UR AUTO: ABNORMAL
BASOPHILS # BLD AUTO: 0.02 K/UL — SIGNIFICANT CHANGE UP (ref 0–0.2)
BASOPHILS NFR BLD AUTO: 0.2 % — SIGNIFICANT CHANGE UP (ref 0–2)
BILIRUB SERPL-MCNC: 0.2 MG/DL — SIGNIFICANT CHANGE UP (ref 0.2–1.2)
BILIRUB UR-MCNC: NEGATIVE — SIGNIFICANT CHANGE UP
BUN SERPL-MCNC: 13 MG/DL — SIGNIFICANT CHANGE UP (ref 7–23)
CALCIUM SERPL-MCNC: 8.9 MG/DL — SIGNIFICANT CHANGE UP (ref 8.4–10.5)
CHLORIDE SERPL-SCNC: 106 MMOL/L — SIGNIFICANT CHANGE UP (ref 98–107)
CO2 SERPL-SCNC: 19 MMOL/L — LOW (ref 22–31)
COLOR SPEC: YELLOW — SIGNIFICANT CHANGE UP
CREAT SERPL-MCNC: 0.53 MG/DL — SIGNIFICANT CHANGE UP (ref 0.5–1.3)
DIFF PNL FLD: NEGATIVE — SIGNIFICANT CHANGE UP
EGFR: 130 ML/MIN/1.73M2 — SIGNIFICANT CHANGE UP
EOSINOPHIL # BLD AUTO: 0.02 K/UL — SIGNIFICANT CHANGE UP (ref 0–0.5)
EOSINOPHIL NFR BLD AUTO: 0.2 % — SIGNIFICANT CHANGE UP (ref 0–6)
EPI CELLS # UR: 10 /HPF — HIGH (ref 0–5)
GLUCOSE SERPL-MCNC: 83 MG/DL — SIGNIFICANT CHANGE UP (ref 70–99)
GLUCOSE UR QL: NEGATIVE — SIGNIFICANT CHANGE UP
HCT VFR BLD CALC: 31.4 % — LOW (ref 34.5–45)
HGB BLD-MCNC: 9.6 G/DL — LOW (ref 11.5–15.5)
IANC: 9.75 K/UL — HIGH (ref 1.8–7.4)
IMM GRANULOCYTES NFR BLD AUTO: 0.4 % — SIGNIFICANT CHANGE UP (ref 0–0.9)
KETONES UR-MCNC: ABNORMAL
LEUKOCYTE ESTERASE UR-ACNC: ABNORMAL
LIDOCAIN IGE QN: 37 U/L — SIGNIFICANT CHANGE UP (ref 7–60)
LYMPHOCYTES # BLD AUTO: 0.98 K/UL — LOW (ref 1–3.3)
LYMPHOCYTES # BLD AUTO: 8.6 % — LOW (ref 13–44)
MCHC RBC-ENTMCNC: 24.2 PG — LOW (ref 27–34)
MCHC RBC-ENTMCNC: 30.6 GM/DL — LOW (ref 32–36)
MCV RBC AUTO: 79.1 FL — LOW (ref 80–100)
MONOCYTES # BLD AUTO: 0.51 K/UL — SIGNIFICANT CHANGE UP (ref 0–0.9)
MONOCYTES NFR BLD AUTO: 4.5 % — SIGNIFICANT CHANGE UP (ref 2–14)
NEUTROPHILS # BLD AUTO: 9.75 K/UL — HIGH (ref 1.8–7.4)
NEUTROPHILS NFR BLD AUTO: 86.1 % — HIGH (ref 43–77)
NITRITE UR-MCNC: NEGATIVE — SIGNIFICANT CHANGE UP
NRBC # BLD: 0 /100 WBCS — SIGNIFICANT CHANGE UP (ref 0–0)
NRBC # FLD: 0 K/UL — SIGNIFICANT CHANGE UP (ref 0–0)
PH UR: 6 — SIGNIFICANT CHANGE UP (ref 5–8)
PLATELET # BLD AUTO: 144 K/UL — LOW (ref 150–400)
POTASSIUM SERPL-MCNC: 3.7 MMOL/L — SIGNIFICANT CHANGE UP (ref 3.5–5.3)
POTASSIUM SERPL-SCNC: 3.7 MMOL/L — SIGNIFICANT CHANGE UP (ref 3.5–5.3)
PROT SERPL-MCNC: 7.5 G/DL — SIGNIFICANT CHANGE UP (ref 6–8.3)
PROT UR-MCNC: ABNORMAL
RBC # BLD: 3.97 M/UL — SIGNIFICANT CHANGE UP (ref 3.8–5.2)
RBC # FLD: 14.9 % — HIGH (ref 10.3–14.5)
RBC CASTS # UR COMP ASSIST: 4 /HPF — SIGNIFICANT CHANGE UP (ref 0–4)
SODIUM SERPL-SCNC: 136 MMOL/L — SIGNIFICANT CHANGE UP (ref 135–145)
SP GR SPEC: 1.03 — SIGNIFICANT CHANGE UP (ref 1.01–1.05)
UROBILINOGEN FLD QL: SIGNIFICANT CHANGE UP
WBC # BLD: 11.33 K/UL — HIGH (ref 3.8–10.5)
WBC # FLD AUTO: 11.33 K/UL — HIGH (ref 3.8–10.5)
WBC UR QL: 5 /HPF — SIGNIFICANT CHANGE UP (ref 0–5)

## 2023-01-10 PROCEDURE — 99284 EMERGENCY DEPT VISIT MOD MDM: CPT

## 2023-01-10 RX ORDER — ONDANSETRON 8 MG/1
1 TABLET, FILM COATED ORAL
Qty: 12 | Refills: 0
Start: 2023-01-10 | End: 2023-01-13

## 2023-01-10 RX ORDER — FAMOTIDINE 10 MG/ML
20 INJECTION INTRAVENOUS ONCE
Refills: 0 | Status: COMPLETED | OUTPATIENT
Start: 2023-01-10 | End: 2023-01-10

## 2023-01-10 RX ORDER — ONDANSETRON 8 MG/1
4 TABLET, FILM COATED ORAL ONCE
Refills: 0 | Status: COMPLETED | OUTPATIENT
Start: 2023-01-10 | End: 2023-01-10

## 2023-01-10 RX ORDER — SODIUM CHLORIDE 9 MG/ML
1000 INJECTION INTRAMUSCULAR; INTRAVENOUS; SUBCUTANEOUS ONCE
Refills: 0 | Status: COMPLETED | OUTPATIENT
Start: 2023-01-10 | End: 2023-01-10

## 2023-01-10 RX ADMIN — SODIUM CHLORIDE 1000 MILLILITER(S): 9 INJECTION INTRAMUSCULAR; INTRAVENOUS; SUBCUTANEOUS at 14:44

## 2023-01-10 RX ADMIN — ONDANSETRON 4 MILLIGRAM(S): 8 TABLET, FILM COATED ORAL at 14:44

## 2023-01-10 RX ADMIN — FAMOTIDINE 20 MILLIGRAM(S): 10 INJECTION INTRAVENOUS at 14:44

## 2023-01-10 NOTE — ED ADULT NURSE NOTE - OBJECTIVE STATEMENT
Pt received to [facility], awake and alert, A&OX4, ambulatory, 5.5 months pregnant. , one stillbirth and one . Reporting multiple episodes of nonbloody nonbilious emesis over the last 3 days. Denies abd pain, vaginal bleeding or discharge. Respirations even and unlabored. Resting comfortably. Denies CP, SOB, N/V, HA, dizziness, palpitations, blurry vision, vaginal bleeding or discharge. 22G IV placed to RAC . Bed in lowest position, call bell within reach. Fluids running. NAD

## 2023-01-10 NOTE — ED PROVIDER NOTE - NSFOLLOWUPINSTRUCTIONS_ED_ALL_ED_FT
Nausea and Vomiting in Pregnancy    WHAT YOU NEED TO KNOW:    Nausea and vomiting can happen any time of day. These symptoms usually start before the 9th week of pregnancy, and end by the 14th week (second trimester). Some women can have nausea and vomiting for a longer time. These symptoms can make it hard for you to do your daily activities.     DISCHARGE INSTRUCTIONS:    Return to the emergency department if:   •You are dizzy, cold, and thirsty, and your eyes and mouth are dry.      •You are urinating very little or not at all.      •You are dizzy or lightheaded when you stand up.      •You see blood or material that looks like coffee grounds in your vomit.      Call your doctor if:   •You vomit more than 4 times in 1 day.      •You have not been able to keep liquids down for more than 1 day.      •You lose more than 2 pounds.      •You have a fever.      •Your nausea and vomiting continue longer than 14 weeks.       •You have questions or concerns about your condition or care.      Nutrition changes you can make to manage nausea and vomiting:   •Eat smaller meals, more often. Eat a small snack, such as crackers, dry cereal, or a small sandwich before you go to bed.      •Eat some crackers or dry toast before you get out of bed in the morning. Get out of bed slowly. Sudden movements could cause you to get dizzy and nauseated.       •Eat bland foods when you feel nauseated. Examples of bland foods include dry toast, dry cereal, plain pasta, white rice, and bread. Other bland foods include saltine crackers, bananas, gelatin, and pretzels. Avoid spicy, greasy, and fried foods.      •Drink liquids that contain ginger. Drink ginger ale made with real ginger or ginger tea made with fresh grated ginger. Ginger capsules or ginger candies may also help to decrease nausea and vomiting.       •Drink liquids between meals instead of with meals. Wait at least 30 minutes after you eat to drink liquids. Drink small amounts of liquids often throughout the day to prevent dehydration. Ask how much liquid you should drink each day.      Other changes you can make to manage nausea and vomiting:   •Avoid smells that bother you. Strong odors may cause nausea and vomiting to start, or make it worse.       •Do not brush your teeth right after you eat if it makes you nauseated.      •Rest when you need to. Start activity slowly and work up to your usual routine as you start to feel better.      •Talk to your healthcare provider about your prenatal vitamins. Prenatal vitamins can cause nausea for some women. Try taking your prenatal vitamin at night or with a snack. If this change does not help, your healthcare provider may recommend a different type of vitamin.       •Light to moderate exercise may help to decrease your symptoms. It may also help you to sleep better at night. Ask your healthcare provider about the best exercise plan for you.       Follow up with your doctor as directed: Write down your questions so you remember to ask them during your visits.

## 2023-01-10 NOTE — ED PROVIDER NOTE - PROGRESS NOTE DETAILS
patient feels much improved, po chal passed, will dc with zofran and recs to f/u with OBGYN this week.

## 2023-01-10 NOTE — ED PROVIDER NOTE - OBJECTIVE STATEMENT
27-year-old female with past medical history of anemia G6, P3 approximately 22 weeks gestation presents to the ER complaining about nausea throughout pregnancy however has been having vomiting and inability to tolerate p.o. x3 days.  Has not taken anything at home for nausea/vomiting.  Denies fevers, chills, chest pain, shortness of breath, headache abdominal pain, pelvic pain, vaginal bleeding, vaginal discharge, leakage of fluid, dysuria, hematuria, weakness, numbness, tingling, sick contacts

## 2023-01-10 NOTE — ED PROVIDER NOTE - PATIENT PORTAL LINK FT
You can access the FollowMyHealth Patient Portal offered by [facility] by registering at the following website: [email or web address]. By joining [employer]’s FollowMyHealth portal, you will also be able to view your health information using other applications (apps) compatible with our system.

## 2023-01-10 NOTE — ED PROVIDER NOTE - DIFFERENTIAL DIAGNOSIS
hyperemesis, uti, gastritis, less likely pancreatitis, cholecystitis given no pain. Differential Diagnosis

## 2023-01-10 NOTE — ED PROVIDER NOTE - PHYSICAL EXAMINATION
Vital signs reviewed.   CONSTITUTIONAL: Well-appearing; well-nourished; in no apparent distress. Non-toxic appearing.   HEAD: Normocephalic, atraumatic.  EYES: PERRL, EOM intact, conjunctiva and sclera WNL.  CARD: Normal S1, S2; no murmurs, rubs, or gallops noted.  RESP: Normal chest excursion with respiration; breath sounds clear and equal bilaterally; no wheezes, rhonchi, or rales.  ABD/GI: soft, gravid, non-tender   EXT/MS: moves all extremities; distal pulses are normal, no pedal edema.  SKIN: Normal for age and race; warm; dry; good turgor; no apparent lesions or exudate noted.  NEURO: Awake, alert, oriented x 3, no gross deficits, CN II-XII grossly intact, no motor or sensory deficit noted.  PSYCH: Normal mood; appropriate affect. Vital signs reviewed.   CONSTITUTIONAL: Well-appearing; well-nourished; in no apparent distress. Non-toxic appearing.   HEAD: Normocephalic, atraumatic.  EYES: PERRL, EOM intact, conjunctiva and sclera WNL.  CARD: Normal S1, S2; no murmurs, rubs, or gallops noted.  RESP: Normal chest excursion with respiration; breath sounds clear and equal bilaterally; no wheezes, rhonchi, or rales.  ABD/GI: soft, gravid, non-tender   EXT/MS: moves all extremities; distal pulses are normal, no pedal edema.  SKIN: Normal for age and race; warm; dry; good turgor; no apparent lesions or exudate noted.  NEURO: Awake, alert, oriented x 3, no gross deficits, CN II-XII grossly intact, no motor or sensory deficit noted.  PSYCH: Normal mood; appropriate affect.

## 2023-01-10 NOTE — ED PROVIDER NOTE - CLINICAL SUMMARY MEDICAL DECISION MAKING FREE TEXT BOX
27-year-old female with past medical history of anemia G6, P3 approximately 22 weeks gestation presents to the ER complaining about nausea throughout pregnancy however has been having vomiting and inability to tolerate p.o. x3 days.  Has not taken anything at home for nausea/vomiting.  labs, lipase, urine.     FHR: 27-year-old female with past medical history of anemia G6, P3 approximately 22 weeks gestation presents to the ER complaining about nausea throughout pregnancy however has been having vomiting and inability to tolerate p.o. x3 days.  Has not taken anything at home for nausea/vomiting.  labs, lipase, urine.     FHR: 152

## 2023-01-12 LAB
CULTURE RESULTS: SIGNIFICANT CHANGE UP
SPECIMEN SOURCE: SIGNIFICANT CHANGE UP

## 2023-01-15 NOTE — ED POST DISCHARGE NOTE - RESULT SUMMARY
UCX: Coag staph neg 10,000-49,000CFU/ml . No antibiotic listed in ED provider note or prescription writer at time of discharge. Patient pregnant message left with Call Back  P.A. number and hours for return call back.

## 2023-01-22 ENCOUNTER — EMERGENCY (EMERGENCY)
Facility: HOSPITAL | Age: 28
LOS: 1 days | Discharge: STILL ADMITTED OR ADMINISTRATIVE | End: 2023-01-22
Admitting: EMERGENCY MEDICINE
Payer: MEDICAID

## 2023-01-22 ENCOUNTER — APPOINTMENT (OUTPATIENT)
Dept: ANTEPARTUM | Facility: CLINIC | Age: 28
End: 2023-01-22

## 2023-01-22 ENCOUNTER — OUTPATIENT (OUTPATIENT)
Dept: INPATIENT UNIT | Facility: HOSPITAL | Age: 28
LOS: 1 days | Discharge: HOME | End: 2023-01-22
Payer: MEDICAID

## 2023-01-22 VITALS — DIASTOLIC BLOOD PRESSURE: 73 MMHG | HEART RATE: 88 BPM | SYSTOLIC BLOOD PRESSURE: 124 MMHG

## 2023-01-22 VITALS
RESPIRATION RATE: 16 BRPM | HEART RATE: 97 BPM | DIASTOLIC BLOOD PRESSURE: 61 MMHG | OXYGEN SATURATION: 100 % | TEMPERATURE: 98 F | SYSTOLIC BLOOD PRESSURE: 121 MMHG

## 2023-01-22 VITALS — TEMPERATURE: 98 F

## 2023-01-22 DIAGNOSIS — O26.899 OTHER SPECIFIED PREGNANCY RELATED CONDITIONS, UNSPECIFIED TRIMESTER: ICD-10-CM

## 2023-01-22 DIAGNOSIS — Z98.890 OTHER SPECIFIED POSTPROCEDURAL STATES: Chronic | ICD-10-CM

## 2023-01-22 DIAGNOSIS — O36.4XX0 MATERNAL CARE FOR INTRAUTERINE DEATH, NOT APPLICABLE OR UNSPECIFIED: Chronic | ICD-10-CM

## 2023-01-22 LAB
ALBUMIN SERPL ELPH-MCNC: 4.1 G/DL — SIGNIFICANT CHANGE UP (ref 3.3–5)
ALP SERPL-CCNC: 92 U/L — SIGNIFICANT CHANGE UP (ref 40–120)
ALT FLD-CCNC: 9 U/L — SIGNIFICANT CHANGE UP (ref 4–33)
ANION GAP SERPL CALC-SCNC: 10 MMOL/L — SIGNIFICANT CHANGE UP (ref 7–14)
APPEARANCE UR: ABNORMAL
APTT BLD: 28.8 SEC — SIGNIFICANT CHANGE UP (ref 27–36.3)
AST SERPL-CCNC: 14 U/L — SIGNIFICANT CHANGE UP (ref 4–32)
BACTERIA # UR AUTO: ABNORMAL
BASOPHILS # BLD AUTO: 0.02 K/UL — SIGNIFICANT CHANGE UP (ref 0–0.2)
BASOPHILS NFR BLD AUTO: 0.2 % — SIGNIFICANT CHANGE UP (ref 0–2)
BILIRUB SERPL-MCNC: 0.2 MG/DL — SIGNIFICANT CHANGE UP (ref 0.2–1.2)
BILIRUB UR-MCNC: NEGATIVE — SIGNIFICANT CHANGE UP
BUN SERPL-MCNC: 12 MG/DL — SIGNIFICANT CHANGE UP (ref 7–23)
CALCIUM SERPL-MCNC: 9.7 MG/DL — SIGNIFICANT CHANGE UP (ref 8.4–10.5)
CHLORIDE SERPL-SCNC: 102 MMOL/L — SIGNIFICANT CHANGE UP (ref 98–107)
CO2 SERPL-SCNC: 23 MMOL/L — SIGNIFICANT CHANGE UP (ref 22–31)
COLOR SPEC: YELLOW — SIGNIFICANT CHANGE UP
COMMENT - URINE: SIGNIFICANT CHANGE UP
CREAT ?TM UR-MCNC: 207 MG/DL — SIGNIFICANT CHANGE UP
CREAT SERPL-MCNC: 0.61 MG/DL — SIGNIFICANT CHANGE UP (ref 0.5–1.3)
DIFF PNL FLD: NEGATIVE — SIGNIFICANT CHANGE UP
EGFR: 126 ML/MIN/1.73M2 — SIGNIFICANT CHANGE UP
EOSINOPHIL # BLD AUTO: 0.12 K/UL — SIGNIFICANT CHANGE UP (ref 0–0.5)
EOSINOPHIL NFR BLD AUTO: 1.2 % — SIGNIFICANT CHANGE UP (ref 0–6)
EPI CELLS # UR: 6 /HPF — HIGH (ref 0–5)
FIBRINOGEN PPP-MCNC: 543 MG/DL — HIGH (ref 200–465)
GLUCOSE SERPL-MCNC: 100 MG/DL — HIGH (ref 70–99)
GLUCOSE UR QL: NEGATIVE — SIGNIFICANT CHANGE UP
HCT VFR BLD CALC: 29.4 % — LOW (ref 34.5–45)
HGB BLD-MCNC: 9 G/DL — LOW (ref 11.5–15.5)
HYALINE CASTS # UR AUTO: 0 /LPF — SIGNIFICANT CHANGE UP (ref 0–7)
IANC: 7.54 K/UL — HIGH (ref 1.8–7.4)
IMM GRANULOCYTES NFR BLD AUTO: 0.6 % — SIGNIFICANT CHANGE UP (ref 0–0.9)
INR BLD: 0.98 RATIO — SIGNIFICANT CHANGE UP (ref 0.88–1.16)
KETONES UR-MCNC: NEGATIVE — SIGNIFICANT CHANGE UP
LDH SERPL L TO P-CCNC: 168 U/L — SIGNIFICANT CHANGE UP (ref 135–225)
LEUKOCYTE ESTERASE UR-ACNC: NEGATIVE — SIGNIFICANT CHANGE UP
LYMPHOCYTES # BLD AUTO: 1.66 K/UL — SIGNIFICANT CHANGE UP (ref 1–3.3)
LYMPHOCYTES # BLD AUTO: 16.5 % — SIGNIFICANT CHANGE UP (ref 13–44)
MCHC RBC-ENTMCNC: 24 PG — LOW (ref 27–34)
MCHC RBC-ENTMCNC: 30.6 GM/DL — LOW (ref 32–36)
MCV RBC AUTO: 78.4 FL — LOW (ref 80–100)
MONOCYTES # BLD AUTO: 0.67 K/UL — SIGNIFICANT CHANGE UP (ref 0–0.9)
MONOCYTES NFR BLD AUTO: 6.7 % — SIGNIFICANT CHANGE UP (ref 2–14)
NEUTROPHILS # BLD AUTO: 7.54 K/UL — HIGH (ref 1.8–7.4)
NEUTROPHILS NFR BLD AUTO: 74.8 % — SIGNIFICANT CHANGE UP (ref 43–77)
NITRITE UR-MCNC: NEGATIVE — SIGNIFICANT CHANGE UP
NRBC # BLD: 0 /100 WBCS — SIGNIFICANT CHANGE UP (ref 0–0)
NRBC # FLD: 0 K/UL — SIGNIFICANT CHANGE UP (ref 0–0)
PH UR: 7 — SIGNIFICANT CHANGE UP (ref 5–8)
PLATELET # BLD AUTO: 151 K/UL — SIGNIFICANT CHANGE UP (ref 150–400)
POTASSIUM SERPL-MCNC: 3.9 MMOL/L — SIGNIFICANT CHANGE UP (ref 3.5–5.3)
POTASSIUM SERPL-SCNC: 3.9 MMOL/L — SIGNIFICANT CHANGE UP (ref 3.5–5.3)
PROT ?TM UR-MCNC: 35 MG/DL — SIGNIFICANT CHANGE UP
PROT SERPL-MCNC: 7.5 G/DL — SIGNIFICANT CHANGE UP (ref 6–8.3)
PROT UR-MCNC: ABNORMAL
PROT/CREAT UR-RTO: 0.2 RATIO — SIGNIFICANT CHANGE UP (ref 0–0.2)
PROTHROM AB SERPL-ACNC: 11.4 SEC — SIGNIFICANT CHANGE UP (ref 10.5–13.4)
RBC # BLD: 3.75 M/UL — LOW (ref 3.8–5.2)
RBC # FLD: 15 % — HIGH (ref 10.3–14.5)
RBC CASTS # UR COMP ASSIST: 4 /HPF — SIGNIFICANT CHANGE UP (ref 0–4)
SODIUM SERPL-SCNC: 135 MMOL/L — SIGNIFICANT CHANGE UP (ref 135–145)
SP GR SPEC: 1.03 — SIGNIFICANT CHANGE UP (ref 1.01–1.05)
URATE SERPL-MCNC: 2.7 MG/DL — SIGNIFICANT CHANGE UP (ref 2.5–7)
UROBILINOGEN FLD QL: SIGNIFICANT CHANGE UP
WBC # BLD: 10.07 K/UL — SIGNIFICANT CHANGE UP (ref 3.8–10.5)
WBC # FLD AUTO: 10.07 K/UL — SIGNIFICANT CHANGE UP (ref 3.8–10.5)
WBC UR QL: 6 /HPF — HIGH (ref 0–5)

## 2023-01-22 PROCEDURE — 76705 ECHO EXAM OF ABDOMEN: CPT | Mod: 26

## 2023-01-22 PROCEDURE — 99222 1ST HOSP IP/OBS MODERATE 55: CPT

## 2023-01-22 PROCEDURE — 76775 US EXAM ABDO BACK WALL LIM: CPT | Mod: 26,59

## 2023-01-22 PROCEDURE — L9996: CPT

## 2023-01-22 NOTE — OB PROVIDER TRIAGE NOTE - HISTORY OF PRESENT ILLNESS
27 year old  at 27.1 presents with c/o right sided back pain that radiates across her lower abdomen that started at 10pm, states that her pain is 7/10. Patient reports taking tylenol at midnight with no relief. Denies contractions, leaking of fluid, vaginal bleeding, good fetal movement noted. Patient reports having a stuffy nose, denies fevers, chills, nausea, vomiting, diarrhea, dysuria. Reports having a bowel movement yesterday.  Noted to have one labile bp, denies headaches, blurry vision, epigastric pain. Patient denies ob complications and states that her last prenatal visit was in Dec. due to missing her appointment.    PMH: Anemia, PEC in previous pregnancies x2   PSH: Left breast biopsy    Allergies: NKDA  Meds: PNV, iron   Denies hx of anxiety, depression, pp depression  Denies use of etoh, drugs, tobacco during pregnancy  27 year old  at 27.1 presents with c/o constant right sided back pain that radiates across her lower abdomen that started at 10pm, states that her pain is 7/10. Patient reports taking tylenol at midnight with no relief. Denies contractions, leaking of fluid, vaginal bleeding, good fetal movement noted. Patient reports having a stuffy nose, denies fevers, chills, nausea, vomiting, diarrhea, dysuria. Reports having a bowel movement yesterday.  Noted to have one labile bp, denies headaches, blurry vision, epigastric pain. Patient denies ob complications and states that her last prenatal visit was in Dec. due to missing her appointment.    PMH: Anemia, PEC in previous pregnancies x2   PSH: Left breast biopsy    Allergies: NKDA  Meds: PNV, iron   Denies hx of anxiety, depression, pp depression  Denies use of etoh, drugs, tobacco during pregnancy  27 year old  at 27.1 presents with c/o constant mid to lower right sided back pain that radiates across her lower abdomen that started at 10pm, states that her pain is 7/10. Patient reports taking tylenol at midnight with no relief.  Denies contractions, leaking of fluid, vaginal bleeding, good fetal movement noted. Patient reports having a stuffy nose, denies fevers, chills, nausea, vomiting, diarrhea, dysuria. Reports having a bowel movement yesterday.  Noted to have one labile bp, denies headaches, blurry vision, epigastric pain. Patient denies ob complications and states that her last prenatal visit was in Dec. due to missing her appointment.    PMH: Anemia, PEC in previous pregnancies x2   PSH: Left breast biopsy    Allergies: NKDA  Meds: PNV, iron   Denies hx of anxiety, depression, pp depression  Denies use of etoh, drugs, tobacco during pregnancy

## 2023-01-22 NOTE — OB PROVIDER TRIAGE NOTE - NSOBPROVIDERNOTE_OBGYN_ALL_OB_FT
ZARIA and HELXI sent.  patient d/w Dr. Javier   for Renal ultrasounds to r/o kidney stones and for Pelvis ultrasound to r/o appendicits.   will continue to monitor UA and HELLP sent.  patient d/w Dr. Javier   for Renal ultrasounds to r/o kidney stones and for Pelvis ultrasound to r/o appendicits.   will continue to monitor    0545: HELXI WNL.  awaiting RLQ and renal US UA and HELXI sent.  patient d/w Dr. Javier   for Renal ultrasounds to r/o kidney stones and for Pelvis ultrasound to r/o appendicits.   will continue to monitor    0545: HELLP WNL.  awaiting RLQ and renal US  Dr. Mcdonald notified.  No new orders at this time   will continue to monitor UA and HELLP sent.  patient d/w Dr. Javier   for Renal ultrasounds to r/o kidney stones and for Pelvis ultrasound to r/o appendicits.   will continue to monitor    0545: HELLP WNL.  awaiting RLQ and renal US  Dr. Mcdonald notified.  No new orders at this time   will continue to monitor    0600: tracing reviewed with Dr. Javier due to variables.   Patient to continue nst after ultrasound.   will continue to monitor UA and HELLP sent.  patient d/w Dr. Javier   for Renal ultrasounds to r/o kidney stones and for Pelvis ultrasound to r/o appendicits.   will continue to monitor    0545: HELLP WNL.  awaiting RLQ and renal US  Dr. Mcdonald notified.  No new orders at this time   will continue to monitor    0600: tracing reviewed with Dr. Javier due to variables.   Patient to continue nst after ultrasound.   will continue to monitor    0605: to US via wheelchair UA and HELLP sent.  patient d/w Dr. Javier   for Renal ultrasounds to r/o kidney stones and for Pelvis ultrasound to r/o appendicits.   will continue to monitor    0545: HELLP WNL.  awaiting RLQ and renal US  Dr. Mcdonald notified.  No new orders at this time   will continue to monitor    0600: tracing reviewed with Dr. Javier due to variables.   Patient to continue nst after ultrasound.   will continue to monitor    0605: to US via wheelchair    0635: returned from US reattached to [facility]   will continue to monitor UA and HELLP sent.  patient d/w Dr. Javier   for Renal ultrasounds to r/o kidney stones and for Pelvis ultrasound to r/o appendicits.   will continue to monitor    0545: HELLP WNL.  awaiting RLQ and renal US  Dr. Mcdonald notified.  No new orders at this time   will continue to monitor    0600: tracing reviewed with Dr. Javier due to variables.   Patient to continue nst after ultrasound.   will continue to monitor    0605: to US via wheelchair    0635: returned from US reattached to [facility]   will continue to monitor  Patient reports that her abdominal pain is better however still has right sided pain, pt reports having to lift her baby at home.   awaiting US results UA and HELLP sent.  patient d/w Dr. Javier   for Renal ultrasounds to r/o kidney stones and for Pelvis ultrasound to r/o appendicits.   will continue to monitor    0545: HELLP WNL.  awaiting RLQ and renal US  Dr. Mcdonald notified.  No new orders at this time   will continue to monitor    0600: tracing reviewed with Dr. Javier due to variables.   Patient to continue nst after ultrasound.   will continue to monitor    0605: to US via wheelchair    0635: returned from US reattached to [facility]   will continue to monitor  Patient reports that her abdominal pain is better however still has right sided pain, pt reports having to lift her baby at home.   awaiting US results    ----------------------------------------------------------------  Patient received from night team at sign out  07:55: Patient notes improvement of symptoms, pain is currently 7/10 and is sleeping comfortably. Notes that pain is worse with movement and improves with rest. Pain originates in right lumbar back and radiates to right hip. No McBurney point tenderness. Transducer readjusted, will continue to obtain tracing. UA and HELLP sent.  patient d/w Dr. Javier   for Renal ultrasounds to r/o kidney stones and for Pelvis ultrasound to r/o appendicits.   will continue to monitor    0545: HELLP WNL.  awaiting RLQ and renal US  Dr. Mcdonald notified.  No new orders at this time   will continue to monitor    0600: tracing reviewed with Dr. Javier due to variables.   Patient to continue nst after ultrasound.   will continue to monitor    0605: to US via wheelchair    0635: returned from US reattached to [facility]   will continue to monitor  Patient reports that her abdominal pain is better however still has right sided pain, pt reports having to lift her baby at home.   awaiting US results    ----------------------------------------------------------------  Patient received from night team at sign out  07:55: Patient notes improvement of symptoms, pain is currently 7/10 and is sleeping comfortably. Notes that pain is worse with movement and improves with rest. Pain originates in right lumbar back and radiates to right hip. No McBurney point tenderness. Transducer readjusted, will continue to obtain tracing.   08:30: Tracing with occasional variables. Patient given ice chips. Transducer readjusted.   08:45: Dr. Mcdonald notified of pt status. Notes patient is cleared for discharge, recommendation for abx for UTI untreated from visit on 01/10/23,   09:10: Patient discussed at safety round. Dr. Esposito and Dr. Truong, PGY3 present, would like 20 more minutes of tracing and then notes patient stable for discharge.   09:40: Tracing cleared by Dr. Villatoro, PG4    27 year old  at 27.1 presents with c/o constant mid to lower right sided back pain without evidence of PTL or acute abdomen, symptoms likely MSK .     Plan  - Patient to be discharged home with follow up and return precautions  - Macrobid bid x 7 days, per Dr. Mcdonald   - Please follow up with your obstetrician at your next scheduled appointment.   - Please return for decreased / no fetal movement, vaginal bleeding similar to that of a period, leaking / gush of fluid, regular contractions occurring 4-5 minutes  for one hour or requiring pain medication   - Please take your medication as prescribed.   - Patient educated of plan and demonstrate understanding. All questions answered. Discharge instructions provided and signed.     Plan d/w Dr. Mcdonald

## 2023-01-22 NOTE — OB RN TRIAGE NOTE - NSICDXPASTMEDICALHX_GEN_ALL_CORE_FT
PAST MEDICAL HISTORY:  Iron deficiency anemia, unspecified iron deficiency anemia type     Preeclampsia     Termination of pregnancy (fetus) at 6 weeks with D&C

## 2023-01-22 NOTE — OB PROVIDER TRIAGE NOTE - NSHPLABSRESULTS_GEN_ALL_CORE
Urinalysis Basic - ( 2023 03:56 )    Color: Yellow / Appearance: Slightly Turbid / S.031 / pH: x  Gluc: x / Ketone: Negative  / Bili: Negative / Urobili: <2 mg/dL   Blood: x / Protein: 30 mg/dL / Nitrite: Negative   Leuk Esterase: Negative / RBC: 4 /HPF / WBC 6 /HPF   Sq Epi: x / Non Sq Epi: 6 /HPF / Bacteria: Few Urinalysis Basic - ( 2023 03:56 )    Color: Yellow / Appearance: Slightly Turbid / S.031 / pH: x  Gluc: x / Ketone: Negative  / Bili: Negative / Urobili: <2 mg/dL   Blood: x / Protein: 30 mg/dL / Nitrite: Negative   Leuk Esterase: Negative / RBC: 4 /HPF / WBC 6 /HPF   Sq Epi: x / Non Sq Epi: 6 /HPF / Bacteria: Few    PCR 0.2    CBC Full  -  ( 2023 05:00 )  WBC Count : 10.07 K/uL  RBC Count : 3.75 M/uL  Hemoglobin : 9.0 g/dL  Hematocrit : 29.4 %  Platelet Count - Automated : 151 K/uL  Mean Cell Volume : 78.4 fL  Mean Cell Hemoglobin : 24.0 pg  Mean Cell Hemoglobin Concentration : 30.6 gm/dL  Auto Neutrophil # : 7.54 K/uL  Auto Lymphocyte # : 1.66 K/uL  Auto Monocyte # : 0.67 K/uL  Auto Eosinophil # : 0.12 K/uL  Auto Basophil # : 0.02 K/uL  Auto Neutrophil % : 74.8 %  Auto Lymphocyte % : 16.5 %  Auto Monocyte % : 6.7 %  Auto Eosinophil % : 1.2 %  Auto Basophil % : 0.2 %        135  |  102  |  12  ----------------------------<  100<H>  3.9   |  23  |  0.61    Ca    9.7      2023 05:00    TPro  7.5  /  Alb  4.1  /  TBili  0.2  /  DBili  x   /  AST  14  /  ALT  9   /  AlkPhos  92      PT/INR - ( 2023 05:00 )   PT: 11.4 sec;   INR: 0.98 ratio         PTT - ( 2023 05:00 )  PTT:28.8 sec

## 2023-01-22 NOTE — OB RN TRIAGE NOTE - FALL HARM RISK - UNIVERSAL INTERVENTIONS
Bed in lowest position, wheels locked, appropriate side rails in place/Call bell, personal items and telephone in reach/Instruct patient to call for assistance before getting out of bed or chair/Non-slip footwear when patient is out of bed/Petal to call system/Physically safe environment - no spills, clutter or unnecessary equipment/Purposeful Proactive Rounding/Room/bathroom lighting operational, light cord in reach

## 2023-01-22 NOTE — ED ADULT TRIAGE NOTE - CHIEF COMPLAINT QUOTE
Patient c/o R sided abdominal pain for one day. Pt. approx 25 weeks pregnant, ALIVIA 4/22, OBGYN Dr. Mcdonald. Denies bleeding/spotting, no ROM. Per L&D Triage, pt. to go to L&D.

## 2023-01-22 NOTE — OB PROVIDER TRIAGE NOTE - ADDITIONAL INSTRUCTIONS
- Please follow up with your obstetrician at your next scheduled appointment.   - Please return for decreased / no fetal movement, vaginal bleeding similar to that of a period, leaking / gush of fluid, regular contractions occurring 4-5 minutes  for one hour or requiring pain medication   - Please take your medication as prescribed.

## 2023-01-22 NOTE — OB PROVIDER TRIAGE NOTE - NS_OBGYNHISTORY_OBGYN_ALL_OB_FT
AP course uncomplicated   OB: IUFD FT 2015 - PEC         3/21/2018 8#         2019 8#3        2021 6# - PEC  GYN: julissaies

## 2023-01-22 NOTE — OB PROVIDER TRIAGE NOTE - NSHPPHYSICALEXAM_GEN_ALL_CORE
Vital Signs Last 24 Hrs  T(C): 36.4 (22 Jan 2023 04:00), Max: 36.4 (22 Jan 2023 03:04)  T(F): 97.5 (22 Jan 2023 04:00), Max: 97.6 (22 Jan 2023 03:04)  HR: 101 (22 Jan 2023 04:32) (88 - 101)  BP: 147/83 (22 Jan 2023 04:32) (121/61 - 147/83)  BP(mean): --  RR: 15 (22 Jan 2023 04:00) (15 - 16)  SpO2: 100% (22 Jan 2023 03:04) (100% - 100%)    Assessment reveals VSS  General: Female sitting comfortably in no apparent distress  Neuro: No facial asymmetry, no slurred speech, moves all 4 extremities  Mood: Alert and lucid, appropriate mood and affect  Abdomen soft, NT, gravid   mod. variability 10x10 accels no decels, no contractions on toco   Transabdominal Ultrasound- Transverse presentation, MVP 4.39, posterior placenta, good fetal movement noted on sono   Sterile Speculum-cervix appears closed   Transvaginal Ultrasound- CL 4.1-4.3 no funneling, no dynamic changes noted   A&Ox3  Lungs- clear bilateral  Heart- normal rate and regular rhythm  Extremeties- Warm, Dry, no edema present, good pulses Vital Signs Last 24 Hrs  T(C): 36.4 (22 Jan 2023 04:00), Max: 36.4 (22 Jan 2023 03:04)  T(F): 97.5 (22 Jan 2023 04:00), Max: 97.6 (22 Jan 2023 03:04)  HR: 101 (22 Jan 2023 04:32) (88 - 101)  BP: 147/83 (22 Jan 2023 04:32) (121/61 - 147/83)  BP(mean): --  RR: 15 (22 Jan 2023 04:00) (15 - 16)  SpO2: 100% (22 Jan 2023 03:04) (100% - 100%)    Assessment reveals VSS  General: Female sitting comfortably in no apparent distress  Neuro: No facial asymmetry, no slurred speech, moves all 4 extremities  Mood: Alert and lucid, appropriate mood and affect  Abdomen soft, NT, gravid  No CVA tenderness noted bilaterally    mod. variability 10x10 accels no decels, no contractions on toco   Transabdominal Ultrasound- Transverse presentation, MVP 4.39, posterior placenta, good fetal movement noted on sono   Sterile Speculum-cervix appears closed   Transvaginal Ultrasound- CL 4.1-4.3 no funneling, no dynamic changes noted   A&Ox3  Lungs- clear bilateral  Heart- normal rate and regular rhythm  Extremeties- Warm, Dry, no edema present, good pulses Vital Signs Last 24 Hrs  T(C): 36.4 (22 Jan 2023 04:00), Max: 36.4 (22 Jan 2023 03:04)  T(F): 97.5 (22 Jan 2023 04:00), Max: 97.6 (22 Jan 2023 03:04)  HR: 101 (22 Jan 2023 04:32) (88 - 101)  BP: 147/83 (22 Jan 2023 04:32) (121/61 - 147/83)  BP(mean): --  RR: 15 (22 Jan 2023 04:00) (15 - 16)  SpO2: 100% (22 Jan 2023 03:04) (100% - 100%)    Assessment reveals VSS  General: Female sitting comfortably in no apparent distress  Neuro: No facial asymmetry, no slurred speech, moves all 4 extremities  Mood: Alert and lucid, appropriate mood and affect  Abdomen soft, NT, gravid  No CVA tenderness noted bilaterally    mod. variability 10x10 accels no decels, no contractions on toco   Transabdominal Ultrasound- Transverse presentation, MVP 4.39, posterior placenta, good fetal movement noted on sono   Sterile Speculum-cervix appears closed   Transvaginal Ultrasound- CL 4.1-4.3 no funneling, no dynamic changes noted   A&Ox3  Lungs- clear bilateral  Heart- normal rate and regular rhythm  Extremities- Warm, Dry, no edema present, good pulses

## 2023-01-27 DIAGNOSIS — O99.891 OTHER SPECIFIED DISEASES AND CONDITIONS COMPLICATING PREGNANCY: ICD-10-CM

## 2023-01-27 DIAGNOSIS — O26.892 OTHER SPECIFIED PREGNANCY RELATED CONDITIONS, SECOND TRIMESTER: ICD-10-CM

## 2023-01-27 DIAGNOSIS — R10.30 LOWER ABDOMINAL PAIN, UNSPECIFIED: ICD-10-CM

## 2023-01-27 DIAGNOSIS — R03.0 ELEVATED BLOOD-PRESSURE READING, WITHOUT DIAGNOSIS OF HYPERTENSION: ICD-10-CM

## 2023-01-27 DIAGNOSIS — D50.9 IRON DEFICIENCY ANEMIA, UNSPECIFIED: ICD-10-CM

## 2023-01-27 DIAGNOSIS — M54.41 LUMBAGO WITH SCIATICA, RIGHT SIDE: ICD-10-CM

## 2023-01-27 DIAGNOSIS — O99.012 ANEMIA COMPLICATING PREGNANCY, SECOND TRIMESTER: ICD-10-CM

## 2023-01-27 DIAGNOSIS — Z87.59 PERSONAL HISTORY OF OTHER COMPLICATIONS OF PREGNANCY, CHILDBIRTH AND THE PUERPERIUM: ICD-10-CM

## 2023-01-27 DIAGNOSIS — Z3A.27 27 WEEKS GESTATION OF PREGNANCY: ICD-10-CM

## 2023-01-27 DIAGNOSIS — O36.4XX0 MATERNAL CARE FOR INTRAUTERINE DEATH, NOT APPLICABLE OR UNSPECIFIED: ICD-10-CM

## 2023-03-13 NOTE — OB RN DELIVERY SUMMARY - NSBABYASEPSISRSK_OBGYN_N_OB_NU
Diabetes is worsening.  A1c increased but had pneumonia recently with hyperglycemia.  Continue current treatment regimen.  Continue to titrate insulin as needed.  Diabetes will be reassessed in 3 months.  
Hypertension is unchanged.  Continue current treatment regimen.  Blood pressure will be reassessed at the next regular appointment.  
0.05

## 2023-04-30 ENCOUNTER — INPATIENT (INPATIENT)
Facility: HOSPITAL | Age: 28
LOS: 1 days | Discharge: HOME | End: 2023-05-02
Attending: OBSTETRICS & GYNECOLOGY | Admitting: OBSTETRICS & GYNECOLOGY

## 2023-04-30 VITALS — HEART RATE: 92 BPM | DIASTOLIC BLOOD PRESSURE: 79 MMHG | SYSTOLIC BLOOD PRESSURE: 130 MMHG

## 2023-04-30 DIAGNOSIS — Z98.890 OTHER SPECIFIED POSTPROCEDURAL STATES: Chronic | ICD-10-CM

## 2023-04-30 DIAGNOSIS — O26.899 OTHER SPECIFIED PREGNANCY RELATED CONDITIONS, UNSPECIFIED TRIMESTER: ICD-10-CM

## 2023-04-30 DIAGNOSIS — O36.4XX0 MATERNAL CARE FOR INTRAUTERINE DEATH, NOT APPLICABLE OR UNSPECIFIED: Chronic | ICD-10-CM

## 2023-04-30 LAB
ALBUMIN SERPL ELPH-MCNC: 3.9 G/DL — SIGNIFICANT CHANGE UP (ref 3.3–5)
ALP SERPL-CCNC: 117 U/L — SIGNIFICANT CHANGE UP (ref 40–120)
ALT FLD-CCNC: 10 U/L — SIGNIFICANT CHANGE UP (ref 4–33)
ANION GAP SERPL CALC-SCNC: 13 MMOL/L — SIGNIFICANT CHANGE UP (ref 7–14)
ANISOCYTOSIS BLD QL: SLIGHT — SIGNIFICANT CHANGE UP
APTT BLD: 27.5 SEC — SIGNIFICANT CHANGE UP (ref 27–36.3)
AST SERPL-CCNC: 17 U/L — SIGNIFICANT CHANGE UP (ref 4–32)
BASOPHILS # BLD AUTO: 0.07 K/UL — SIGNIFICANT CHANGE UP (ref 0–0.2)
BASOPHILS NFR BLD AUTO: 0.9 % — SIGNIFICANT CHANGE UP (ref 0–2)
BILIRUB SERPL-MCNC: 0.4 MG/DL — SIGNIFICANT CHANGE UP (ref 0.2–1.2)
BLD GP AB SCN SERPL QL: NEGATIVE — SIGNIFICANT CHANGE UP
BUN SERPL-MCNC: 12 MG/DL — SIGNIFICANT CHANGE UP (ref 7–23)
CALCIUM SERPL-MCNC: 9.4 MG/DL — SIGNIFICANT CHANGE UP (ref 8.4–10.5)
CHLORIDE SERPL-SCNC: 102 MMOL/L — SIGNIFICANT CHANGE UP (ref 98–107)
CO2 SERPL-SCNC: 20 MMOL/L — LOW (ref 22–31)
COVID-19 SPIKE DOMAIN AB INTERP: POSITIVE
COVID-19 SPIKE DOMAIN ANTIBODY RESULT: >250 U/ML — HIGH
CREAT SERPL-MCNC: 0.61 MG/DL — SIGNIFICANT CHANGE UP (ref 0.5–1.3)
DACRYOCYTES BLD QL SMEAR: SLIGHT — SIGNIFICANT CHANGE UP
EGFR: 126 ML/MIN/1.73M2 — SIGNIFICANT CHANGE UP
EOSINOPHIL # BLD AUTO: 0 K/UL — SIGNIFICANT CHANGE UP (ref 0–0.5)
EOSINOPHIL NFR BLD AUTO: 0 % — SIGNIFICANT CHANGE UP (ref 0–6)
FIBRINOGEN PPP-MCNC: 530 MG/DL — HIGH (ref 200–465)
GIANT PLATELETS BLD QL SMEAR: PRESENT — SIGNIFICANT CHANGE UP
GLUCOSE SERPL-MCNC: 79 MG/DL — SIGNIFICANT CHANGE UP (ref 70–99)
HCT VFR BLD CALC: 27.5 % — LOW (ref 34.5–45)
HGB BLD-MCNC: 8.1 G/DL — LOW (ref 11.5–15.5)
HYPOCHROMIA BLD QL: SLIGHT — SIGNIFICANT CHANGE UP
IANC: 5.53 K/UL — SIGNIFICANT CHANGE UP (ref 1.8–7.4)
INR BLD: 0.94 RATIO — SIGNIFICANT CHANGE UP (ref 0.88–1.16)
LDH SERPL L TO P-CCNC: 176 U/L — SIGNIFICANT CHANGE UP (ref 135–225)
LYMPHOCYTES # BLD AUTO: 1.28 K/UL — SIGNIFICANT CHANGE UP (ref 1–3.3)
LYMPHOCYTES # BLD AUTO: 15.6 % — SIGNIFICANT CHANGE UP (ref 13–44)
MANUAL SMEAR VERIFICATION: SIGNIFICANT CHANGE UP
MCHC RBC-ENTMCNC: 20.7 PG — LOW (ref 27–34)
MCHC RBC-ENTMCNC: 29.5 GM/DL — LOW (ref 32–36)
MCV RBC AUTO: 70.2 FL — LOW (ref 80–100)
MICROCYTES BLD QL: SLIGHT — SIGNIFICANT CHANGE UP
MONOCYTES # BLD AUTO: 0.5 K/UL — SIGNIFICANT CHANGE UP (ref 0–0.9)
MONOCYTES NFR BLD AUTO: 6.1 % — SIGNIFICANT CHANGE UP (ref 2–14)
NEUTROPHILS # BLD AUTO: 6.3 K/UL — SIGNIFICANT CHANGE UP (ref 1.8–7.4)
NEUTROPHILS NFR BLD AUTO: 76.5 % — SIGNIFICANT CHANGE UP (ref 43–77)
OVALOCYTES BLD QL SMEAR: SLIGHT — SIGNIFICANT CHANGE UP
PLAT MORPH BLD: SIGNIFICANT CHANGE UP
PLATELET # BLD AUTO: 126 K/UL — LOW (ref 150–400)
PLATELET COUNT - ESTIMATE: ABNORMAL
POIKILOCYTOSIS BLD QL AUTO: SLIGHT — SIGNIFICANT CHANGE UP
POLYCHROMASIA BLD QL SMEAR: SLIGHT — SIGNIFICANT CHANGE UP
POTASSIUM SERPL-MCNC: 3.3 MMOL/L — LOW (ref 3.5–5.3)
POTASSIUM SERPL-SCNC: 3.3 MMOL/L — LOW (ref 3.5–5.3)
PROT SERPL-MCNC: 7.2 G/DL — SIGNIFICANT CHANGE UP (ref 6–8.3)
PROTHROM AB SERPL-ACNC: 10.9 SEC — SIGNIFICANT CHANGE UP (ref 10.5–13.4)
RBC # BLD: 3.92 M/UL — SIGNIFICANT CHANGE UP (ref 3.8–5.2)
RBC # FLD: 18.6 % — HIGH (ref 10.3–14.5)
RBC BLD AUTO: SIGNIFICANT CHANGE UP
RH IG SCN BLD-IMP: POSITIVE — SIGNIFICANT CHANGE UP
SARS-COV-2 IGG+IGM SERPL QL IA: >250 U/ML — HIGH
SARS-COV-2 IGG+IGM SERPL QL IA: POSITIVE
SODIUM SERPL-SCNC: 135 MMOL/L — SIGNIFICANT CHANGE UP (ref 135–145)
URATE SERPL-MCNC: 3.3 MG/DL — SIGNIFICANT CHANGE UP (ref 2.5–7)
VARIANT LYMPHS # BLD: 0.9 % — SIGNIFICANT CHANGE UP (ref 0–6)
WBC # BLD: 8.23 K/UL — SIGNIFICANT CHANGE UP (ref 3.8–10.5)
WBC # FLD AUTO: 8.23 K/UL — SIGNIFICANT CHANGE UP (ref 3.8–10.5)

## 2023-04-30 RX ORDER — DIBUCAINE 1 %
1 OINTMENT (GRAM) RECTAL EVERY 6 HOURS
Refills: 0 | Status: DISCONTINUED | OUTPATIENT
Start: 2023-04-30 | End: 2023-05-02

## 2023-04-30 RX ORDER — OXYTOCIN 10 UNIT/ML
333.33 VIAL (ML) INJECTION
Qty: 20 | Refills: 0 | Status: DISCONTINUED | OUTPATIENT
Start: 2023-04-30 | End: 2023-05-01

## 2023-04-30 RX ORDER — OXYCODONE HYDROCHLORIDE 5 MG/1
5 TABLET ORAL ONCE
Refills: 0 | Status: DISCONTINUED | OUTPATIENT
Start: 2023-04-30 | End: 2023-05-02

## 2023-04-30 RX ORDER — HYDROCORTISONE 1 %
1 OINTMENT (GRAM) TOPICAL EVERY 6 HOURS
Refills: 0 | Status: DISCONTINUED | OUTPATIENT
Start: 2023-04-30 | End: 2023-05-02

## 2023-04-30 RX ORDER — OXYCODONE HYDROCHLORIDE 5 MG/1
5 TABLET ORAL
Refills: 0 | Status: DISCONTINUED | OUTPATIENT
Start: 2023-04-30 | End: 2023-05-02

## 2023-04-30 RX ORDER — SODIUM CHLORIDE 9 MG/ML
3 INJECTION INTRAMUSCULAR; INTRAVENOUS; SUBCUTANEOUS EVERY 8 HOURS
Refills: 0 | Status: DISCONTINUED | OUTPATIENT
Start: 2023-04-30 | End: 2023-05-02

## 2023-04-30 RX ORDER — CITRIC ACID/SODIUM CITRATE 300-500 MG
15 SOLUTION, ORAL ORAL EVERY 6 HOURS
Refills: 0 | Status: DISCONTINUED | OUTPATIENT
Start: 2023-04-30 | End: 2023-04-30

## 2023-04-30 RX ORDER — BENZOYL PEROXIDE MICRONIZED 5.8 %
1 TOWELETTE (EA) TOPICAL
Qty: 0 | Refills: 0 | DISCHARGE

## 2023-04-30 RX ORDER — PRAMOXINE HYDROCHLORIDE 150 MG/15G
1 AEROSOL, FOAM RECTAL EVERY 4 HOURS
Refills: 0 | Status: DISCONTINUED | OUTPATIENT
Start: 2023-04-30 | End: 2023-05-02

## 2023-04-30 RX ORDER — CHLORHEXIDINE GLUCONATE 213 G/1000ML
1 SOLUTION TOPICAL ONCE
Refills: 0 | Status: DISCONTINUED | OUTPATIENT
Start: 2023-04-30 | End: 2023-04-30

## 2023-04-30 RX ORDER — IBUPROFEN 200 MG
600 TABLET ORAL EVERY 6 HOURS
Refills: 0 | Status: COMPLETED | OUTPATIENT
Start: 2023-04-30 | End: 2024-03-28

## 2023-04-30 RX ORDER — BENZOCAINE 10 %
1 GEL (GRAM) MUCOUS MEMBRANE EVERY 6 HOURS
Refills: 0 | Status: DISCONTINUED | OUTPATIENT
Start: 2023-04-30 | End: 2023-05-02

## 2023-04-30 RX ORDER — MAGNESIUM HYDROXIDE 400 MG/1
30 TABLET, CHEWABLE ORAL
Refills: 0 | Status: DISCONTINUED | OUTPATIENT
Start: 2023-04-30 | End: 2023-05-02

## 2023-04-30 RX ORDER — AER TRAVELER 0.5 G/1
1 SOLUTION RECTAL; TOPICAL EVERY 4 HOURS
Refills: 0 | Status: DISCONTINUED | OUTPATIENT
Start: 2023-04-30 | End: 2023-05-02

## 2023-04-30 RX ORDER — SODIUM CHLORIDE 9 MG/ML
1000 INJECTION, SOLUTION INTRAVENOUS
Refills: 0 | Status: DISCONTINUED | OUTPATIENT
Start: 2023-04-30 | End: 2023-04-30

## 2023-04-30 RX ORDER — SIMETHICONE 80 MG/1
80 TABLET, CHEWABLE ORAL EVERY 4 HOURS
Refills: 0 | Status: DISCONTINUED | OUTPATIENT
Start: 2023-04-30 | End: 2023-05-02

## 2023-04-30 RX ORDER — DIPHENHYDRAMINE HCL 50 MG
25 CAPSULE ORAL EVERY 6 HOURS
Refills: 0 | Status: DISCONTINUED | OUTPATIENT
Start: 2023-04-30 | End: 2023-05-02

## 2023-04-30 RX ORDER — OXYTOCIN 10 UNIT/ML
41.67 VIAL (ML) INJECTION
Qty: 20 | Refills: 0 | Status: DISCONTINUED | OUTPATIENT
Start: 2023-04-30 | End: 2023-05-01

## 2023-04-30 RX ORDER — IBUPROFEN 200 MG
600 TABLET ORAL EVERY 6 HOURS
Refills: 0 | Status: DISCONTINUED | OUTPATIENT
Start: 2023-04-30 | End: 2023-05-02

## 2023-04-30 RX ORDER — KETOROLAC TROMETHAMINE 30 MG/ML
30 SYRINGE (ML) INJECTION ONCE
Refills: 0 | Status: DISCONTINUED | OUTPATIENT
Start: 2023-04-30 | End: 2023-04-30

## 2023-04-30 RX ORDER — TETANUS TOXOID, REDUCED DIPHTHERIA TOXOID AND ACELLULAR PERTUSSIS VACCINE, ADSORBED 5; 2.5; 8; 8; 2.5 [IU]/.5ML; [IU]/.5ML; UG/.5ML; UG/.5ML; UG/.5ML
0.5 SUSPENSION INTRAMUSCULAR ONCE
Refills: 0 | Status: COMPLETED | OUTPATIENT
Start: 2023-04-30

## 2023-04-30 RX ORDER — ACETAMINOPHEN 500 MG
975 TABLET ORAL
Refills: 0 | Status: DISCONTINUED | OUTPATIENT
Start: 2023-04-30 | End: 2023-05-02

## 2023-04-30 RX ORDER — LANOLIN
1 OINTMENT (GRAM) TOPICAL EVERY 6 HOURS
Refills: 0 | Status: DISCONTINUED | OUTPATIENT
Start: 2023-04-30 | End: 2023-05-02

## 2023-04-30 RX ADMIN — Medication 30 MILLIGRAM(S): at 18:15

## 2023-04-30 RX ADMIN — Medication 975 MILLIGRAM(S): at 21:55

## 2023-04-30 RX ADMIN — Medication 30 MILLIGRAM(S): at 17:52

## 2023-04-30 RX ADMIN — Medication 600 MILLIGRAM(S): at 23:31

## 2023-04-30 RX ADMIN — Medication 125 MILLIUNIT(S)/MIN: at 19:54

## 2023-04-30 RX ADMIN — Medication 975 MILLIGRAM(S): at 21:01

## 2023-04-30 RX ADMIN — Medication 1000 MILLIUNIT(S)/MIN: at 17:30

## 2023-04-30 NOTE — OB PROVIDER TRIAGE NOTE - NSOBPROVIDERNOTE_OBGYN_ALL_OB_FT
26yo female  @ 39.0 wks SLIUP uncomp PNC here in labor  -VE-5/90/-3; vtx confirmed by sono  -pt with hx of FT IUFD due to pre-eclampsia  -NST cat II  -no PNRs available  -pt was discussed with Dr Maxwell  -pt was approved for epidural

## 2023-04-30 NOTE — OB PROVIDER TRIAGE NOTE - HISTORY OF PRESENT ILLNESS
28yo female  @ 39.0 wks SLIUP uncomp PNC here complaining of ctx's Q 5 min apart; intact with GFM.    GBS negative     Past hx FT IUFD due to pre-eclampsia in     Pmhx- denies  Pshx/Hosp- denies  Meds- PNV; Iron  NKDA  Past ob-2015-7# FT IUFD secondary to pre-eclampsia               3/21/2018-8#0  FT uncomp               2019-8#0  FT uncomp                2021-6#  FT uncomp  Gyn- denies fibroids/ abnl PAP's/ STD's  Soc- denies depression/ anxiety/ panic disorder; denies smoking or drug use 28yo female  @ 39.0 wks SLIUP uncomp PNC here complaining of ctx's Q 5 min apart; intact with GFM.    GBS negative     Past hx FT IUFD due to pre-eclampsia in     Pmhx- denies  Pshx/Hosp- D&C  Meds- PNV; Iron  NKDA  Past ob-TOP x 1 with D&C               2015-7# FT IUFD secondary to pre-eclampsia               3/21/2018-8#0  FT uncomp               2019-8#0  FT uncomp                2021-6#  FT uncomp  Gyn- denies fibroids/ abnl PAP's/ STD's  Soc- denies depression/ anxiety/ panic disorder; denies smoking or drug use

## 2023-04-30 NOTE — OB PROVIDER H&P - HISTORY OF PRESENT ILLNESS
28yo female  @ 39.0 wks SLIUP uncomp PNC here complaining of ctx's Q 5 min apart; intact with GFM.    GBS negative   Pregnancy complicated by anemia    Past hx FT IUFD due to pre-eclampsia 2015    Pmhx- denies  Pshx/Hosp- D&C  Meds- PNV; Iron  NKDA  Past ob-TOP x 1 with D&C                2015-7# FT IUFD secondary to pre-eclampsia               3/21/2018-8#0  FT uncomp               2019-8#0  FT uncomp                2021-6#  FT uncomp  Gyn- denies fibroids/ abnl PAP's/ STD's  Soc- denies depression/ anxiety/ panic disorder; denies smoking or drug use

## 2023-04-30 NOTE — OB RN DELIVERY SUMMARY - NSSELHIDDEN_OBGYN_ALL_OB_FT
[NS_DeliveryAttending1_OBGYN_ALL_OB_FT:[id number]==],[NS_DeliveryRN_OBGYN_ALL_OB_FT:[id number]=]

## 2023-04-30 NOTE — OB PROVIDER H&P - NSLOWPPHRISK_OBGYN_A_OB
No previous uterine incision/[facility] Pregnancy/Less than or equal to 4 previous vaginal births/No known bleeding disorder/No history of postpartum hemorrhage/No other PPH risks indicated

## 2023-04-30 NOTE — OB PROVIDER TRIAGE NOTE - NS_OBGYNHISTORY_OBGYN_ALL_OB_FT
Past ob- 2015-7# FT IUFD secondary to pre-eclampsia               3/21/2018-8#0  FT uncomp               2019-8#0  FT uncomp                2021-6#  FT uncomp  Gyn- denies fibroids/ abnl PAP's/ STD's Past ob- TOP x 1 eith D&C               2015-7# FT IUFD secondary to pre-eclampsia               3/21/2018-8#0  FT uncomp               2019-8#0  FT uncomp                2021-6#  FT uncomp  Gyn- denies fibroids/ abnl PAP's/ STD's

## 2023-04-30 NOTE — OB RN DELIVERY SUMMARY - NS_SEPSISRSKCALC_OBGYN_ALL_OB_FT
GBS status in the 'Prenatal Lab tests/results section' on the OB RN Patient Profile must be documented.   EOS calculated successfully. EOS Risk Factor: 0.5/1000 live births ([facility] national incidence); GA=39w;Temp=97.7; ROM=0.15; GBS='Unknown'; Antibiotics='No antibiotics or any antibiotics < 2 hrs prior to birth'

## 2023-04-30 NOTE — OB RN PATIENT PROFILE - BP NONINVASIVE DIASTOLIC (MM HG)
INTERVENTIONAL RADIOLOGY CONSULT:     Procedure Requested: PCN    HPI:  Patient is a 63 y/o male with PMH of cerebral palsy, seizure disorder, spastic paraplegia, s/p PEG tube, Asthma, H/O nephrolithiasis, BPH with bladder neck stricture s/p suprapubic catheter, and H/O Pseudomonas and ESBL Proteus mirabilis bacteruria who presented from a group with abdominal pain x1 days. Pt states that he started getting a R sided flank pain with radiation to his pelvis after getting his suprapubic cathter tube changes. Pt states that the pain is sharp and constant. Pt also endorses some nausea and vomited x1 in the AM. He also has some pain at the catheter insertion site however catheter has been draining well. Off note pt has a progressive worsening congestion for the last few days. As per aid, pts group home had a resident test postive for COVID. Pt was likely in close contact with resident. Pt also endorses recent subjective fevers. He has had a cough for the last month, pt was recently admitted to the ICU for non-COVID coronavirus bronchitis, never intubated. Denies any other complaints. Denies any chills, changes in weight, chest pain, SOB, diarrhea, myalgias, arthralgias syncope, fatigue.   In the ED, /67  RR 18 SpO2 94 on RA afebrile. CT A/P showed a [id number] proximal right ureteral stone with mod hydronephrosis. Urolgoy was consulted. Pt was given IVF, pain control and Merox1. Swabbed for COVID. (19 May 2020 08:44)      PAST MEDICAL & SURGICAL HISTORY:  Asthma  Urinary retention  Urinary calculi  Spastic quadriplegia  Osteoporosis  Seizure: last seizure &gt;10 years ago  Cerebral palsy  BPH (benign prostatic hyperplasia)  Suprapubic catheter  H/O cystoscopy  S/P percutaneous endoscopic gastrostomy (PEG) tube placement      MEDICATIONS  (STANDING):  baclofen 10 milliGRAM(s) Oral three times a day  calcium carbonate   1250 mG (OsCal) 1 Tablet(s) Oral two times a day  dicyclomine 10 milliGRAM(s) Oral daily  enoxaparin Injectable 40 milliGRAM(s) SubCutaneous daily  lactated ringers. 1000 milliLiter(s) (100 mL/Hr) IV Continuous <Continuous>  lactulose Syrup 30 Gram(s) Oral once  magnesium sulfate  IVPB 2 Gram(s) IV Intermittent once  meropenem  IVPB 1000 milliGRAM(s) IV Intermittent every 8 hours  PHENobarbital 64.8 milliGRAM(s) Oral daily  polyethylene glycol 3350 17 Gram(s) Oral every 12 hours  polyvinyl alcohol 1.4%/povidone 0.6% Ophthalmic Solution - Peds 1 Drop(s) Both EYES four times a day  senna 2 Tablet(s) Oral at bedtime  tamsulosin 0.4 milliGRAM(s) Oral at bedtime    MEDICATIONS  (PRN):  acetaminophen   Tablet .. 650 milliGRAM(s) Oral every 6 hours PRN Temp greater or equal to 38C (100.4F)  ALBUTerol    90 MICROgram(s) HFA Inhaler 2 Puff(s) Inhalation every 6 hours PRN Shortness of Breath and/or Wheezing  benzonatate 100 milliGRAM(s) Oral every 8 hours PRN Cough  ketorolac   Injectable 30 milliGRAM(s) IV Push every 6 hours PRN Severe Pain (7 - 10)  ondansetron Injectable 4 milliGRAM(s) IV Push every 6 hours PRN Nausea      Allergies    No Known Allergies    Intolerances    FAMILY HISTORY:  Family history unknown      Physical Exam:   Vital Signs Last 24 Hrs  T(C): 38.1 (20 May 2020 06:44), Max: 39 (20 May 2020 05:01)  T(F): 100.6 (20 May 2020 06:44), Max: 102.2 (20 May 2020 05:01)  HR: 111 (20 May 2020 05:01) (92 - 116)  BP: 126/76 (20 May 2020 05:01) (79/51 - 129/69)  BP(mean): --  RR: 18 (20 May 2020 05:01) (18 - 18)  SpO2: 97% (20 May 2020 06:44) (96% - 98%)      Labs:                         10.6   8.21  )-----------( 187      ( 20 May 2020 04:57 )             32.1     05-20    139  |  104  |  20  ----------------------------<  106<H>  4.0   |  22  |  0.7    Ca    8.1<L>      20 May 2020 04:57  Phos  2.0     05-20  Mg     1.7     05-20    TPro  5.8<L>  /  Alb  2.8<L>  /  TBili  0.3  /  DBili  x   /  AST  25  /  ALT  30  /  AlkPhos  74  05-20        Pertinent labs:                      10.6   8.21  )-----------( 187      ( 20 May 2020 04:57 )             32.1       05-20    139  |  104  |  20  ----------------------------<  106<H>  4.0   |  22  |  0.7    Ca    8.1<L>      20 May 2020 04:57  Phos  2.0     05-20  Mg     1.7     05-20    TPro  5.8<L>  /  Alb  2.8<L>  /  TBili  0.3  /  DBili  x   /  AST  25  /  ALT  30  /  AlkPhos  74  05-20          Radiology & Additional Studies:     Radiology imaging reviewed.       ASSESSMENT/ PLAN:  64-year-old medically complex gentleman who presented with abdominal pain, found to have a large obstructing right ureteral stone and moderate hydronephrosis, urothelial enhancement. Fever spike to 102.2 today and heart rate increasing to 110's. No leukocytosis.   - Right obstructive ureterolithiasis with urosepsis. Plan for right PCN today. INR 1.07.   - Unable to reach healthcare proxy. Will continue attempts.   - NPO, hold Lovenox.    Risks, benefits, and alternatives to treatment discussed. All questions answered with understanding.    Thank you for the courtesy of this consult, please call [phone] with any further questions. 74

## 2023-04-30 NOTE — OB PROVIDER DELIVERY SUMMARY - NSPROVIDERDELIVERYNOTE_OBGYN_ALL_OB_FT
OVER INTACT PERINEUM  DELIVERED A LIVE FEMALE BABY FROM OA POSITION  DELIVERY UN-EVENTFUL  APGAR 9/9  DELAYED CORD CLAMP DONE  PLACENTA DELIVERED SPONT AND COMPLETE  FUNDUS FIRM WITH NORMAL LOCHIA  NO LACERATIONS

## 2023-04-30 NOTE — OB PROVIDER TRIAGE NOTE - NSHPPHYSICALEXAM_GEN_ALL_CORE
ICU Vital Signs Last 24 Hrs  T(C): 36.4 (30 Apr 2023 15:06), Max: 36.4 (30 Apr 2023 15:06)  T(F): 97.5 (30 Apr 2023 15:06), Max: 97.5 (30 Apr 2023 15:06)  HR: 92 (30 Apr 2023 15:06) (92 - 92)  BP: 130/76 (30 Apr 2023 15:06) (130/76 - 130/79)  BP(mean): --  ABP: --  ABP(mean): --  RR: 18 (30 Apr 2023 15:06) (18 - 18)  SpO2: --    Gen: A&O x 3; uncomfortable with ctx's    Pulm- breathing is unlabored  Abd exam- soft and nontender  Extremities- full range of motion with no evidence of labor    NST cat II with 130 baseline, mod variability with one late decel and one early deceleration; ctx's Q6-8 min  VE-5/90/-3  EFW~3544g  TAS to confirm presentation

## 2023-04-30 NOTE — OB RN TRIAGE NOTE - FALL HARM RISK - FACTORS NURSING JUDGEMENT
Bi-Rhombic Flap Text: The defect edges were debeveled with a #15 scalpel blade.  Given the location of the defect and the proximity to free margins a bi-rhombic flap was deemed most appropriate.  Using a sterile surgical marker, an appropriate rhombic flap was drawn incorporating the defect. The area thus outlined was incised deep to adipose tissue with a #15 scalpel blade.  The skin margins were undermined to an appropriate distance in all directions utilizing iris scissors. No

## 2023-04-30 NOTE — OB RN DELIVERY SUMMARY - APGAR COMPLETED BY
Arrived via EMS due to sickle cell crisis- complains of generalized body pain, headache, blurry vision, and nausea. Was playing volleyball when the episode occurred.   
Nurse

## 2023-04-30 NOTE — OB PROVIDER TRIAGE NOTE - NSFIRSTLIVEBIRTH_OBGYN_ALL_OB_DT
[birth year]Mar-2018 [Symptom and Test Evaluation] : symptom and test evaluation [Arrhythmia/ECG Abnorrmalities] : arrhythmia/ECG abnormalities [Spouse] : spouse [FreeTextEntry1] : Mr. HECTOR DOVE is a 22 year old man with no significant PMHx who is presenting to cardiology clinic for referral due to abnormal ECG (IVCD). Today ECG demonstrates NSR with IVCD. He works in the [employer] and is very active. He has no limitations. He has no SOB, chest pain or palpitations. He has no episodes of lightheadedness or syncope. \par \par He has FHx of CHF and pacemaker, but no SCD. He is a never smoker and has does not use any other illicit drugs. \par

## 2023-05-01 ENCOUNTER — TRANSCRIPTION ENCOUNTER (OUTPATIENT)
Age: 28
End: 2023-05-01

## 2023-05-01 LAB
HCT VFR BLD CALC: 24 % — LOW (ref 34.5–45)
HGB BLD-MCNC: 6.8 G/DL — CRITICAL LOW (ref 11.5–15.5)
T PALLIDUM AB TITR SER: NEGATIVE — SIGNIFICANT CHANGE UP

## 2023-05-01 RX ORDER — ASCORBIC ACID 60 MG
500 TABLET,CHEWABLE ORAL DAILY
Refills: 0 | Status: DISCONTINUED | OUTPATIENT
Start: 2023-05-01 | End: 2023-05-02

## 2023-05-01 RX ORDER — SENNA PLUS 8.6 MG/1
1 TABLET ORAL DAILY
Refills: 0 | Status: DISCONTINUED | OUTPATIENT
Start: 2023-05-01 | End: 2023-05-02

## 2023-05-01 RX ORDER — IBUPROFEN 200 MG
1 TABLET ORAL
Qty: 0 | Refills: 0 | DISCHARGE
Start: 2023-05-01

## 2023-05-01 RX ORDER — FERROUS SULFATE 325(65) MG
325 TABLET ORAL THREE TIMES A DAY
Refills: 0 | Status: DISCONTINUED | OUTPATIENT
Start: 2023-05-01 | End: 2023-05-02

## 2023-05-01 RX ORDER — FERROUS SULFATE 325(65) MG
1 TABLET ORAL
Qty: 0 | Refills: 0 | DISCHARGE
Start: 2023-05-01

## 2023-05-01 RX ADMIN — Medication 600 MILLIGRAM(S): at 18:04

## 2023-05-01 RX ADMIN — SENNA PLUS 1 TABLET(S): 8.6 TABLET ORAL at 13:22

## 2023-05-01 RX ADMIN — AER TRAVELER 1 APPLICATION(S): 0.5 SOLUTION RECTAL; TOPICAL at 20:08

## 2023-05-01 RX ADMIN — SODIUM CHLORIDE 3 MILLILITER(S): 9 INJECTION INTRAMUSCULAR; INTRAVENOUS; SUBCUTANEOUS at 21:50

## 2023-05-01 RX ADMIN — Medication 1 APPLICATION(S): at 20:09

## 2023-05-01 RX ADMIN — Medication 600 MILLIGRAM(S): at 00:14

## 2023-05-01 RX ADMIN — SODIUM CHLORIDE 3 MILLILITER(S): 9 INJECTION INTRAMUSCULAR; INTRAVENOUS; SUBCUTANEOUS at 06:32

## 2023-05-01 RX ADMIN — Medication 975 MILLIGRAM(S): at 08:13

## 2023-05-01 RX ADMIN — Medication 600 MILLIGRAM(S): at 13:21

## 2023-05-01 RX ADMIN — Medication 500 MILLIGRAM(S): at 13:22

## 2023-05-01 RX ADMIN — Medication 975 MILLIGRAM(S): at 15:12

## 2023-05-01 RX ADMIN — Medication 1 TABLET(S): at 13:21

## 2023-05-01 RX ADMIN — Medication 325 MILLIGRAM(S): at 13:21

## 2023-05-01 RX ADMIN — Medication 600 MILLIGRAM(S): at 14:21

## 2023-05-01 RX ADMIN — Medication 600 MILLIGRAM(S): at 05:38

## 2023-05-01 RX ADMIN — Medication 325 MILLIGRAM(S): at 22:00

## 2023-05-01 RX ADMIN — Medication 975 MILLIGRAM(S): at 03:55

## 2023-05-01 RX ADMIN — PRAMOXINE HYDROCHLORIDE 1 APPLICATION(S): 150 AEROSOL, FOAM RECTAL at 20:09

## 2023-05-01 RX ADMIN — Medication 600 MILLIGRAM(S): at 19:30

## 2023-05-01 RX ADMIN — SODIUM CHLORIDE 3 MILLILITER(S): 9 INJECTION INTRAMUSCULAR; INTRAVENOUS; SUBCUTANEOUS at 13:29

## 2023-05-01 RX ADMIN — Medication 600 MILLIGRAM(S): at 06:32

## 2023-05-01 RX ADMIN — Medication 975 MILLIGRAM(S): at 21:50

## 2023-05-01 RX ADMIN — Medication 975 MILLIGRAM(S): at 03:07

## 2023-05-01 RX ADMIN — Medication 975 MILLIGRAM(S): at 20:54

## 2023-05-01 RX ADMIN — Medication 975 MILLIGRAM(S): at 16:12

## 2023-05-01 RX ADMIN — Medication 975 MILLIGRAM(S): at 09:13

## 2023-05-01 RX ADMIN — SODIUM CHLORIDE 3 MILLILITER(S): 9 INJECTION INTRAMUSCULAR; INTRAVENOUS; SUBCUTANEOUS at 00:16

## 2023-05-01 NOTE — DISCHARGE NOTE OB - CARE PLAN
1 Principal Discharge DX:	Vaginal delivery  Assessment and plan of treatment:	28 y/o  s/o   hx of chronic anemia, admission hemoglobin 8.6, PPD# decreased to 6.8  pt asymptomatic, offered transfusion of PRBCs- declined multiple times  d/c home stable on PPD#2, oral iron supplements  anemia precautions and PIH precautions (hx of IUFD due pre-eclampsia)

## 2023-05-01 NOTE — DISCHARGE NOTE OB - PLAN OF CARE
26 y/o  s/o   hx of chronic anemia, admission hemoglobin 8.6, PPD# decreased to 6.8  pt asymptomatic, offered transfusion of PRBCs- declined multiple times  d/c home stable on PPD#2, oral iron supplements  anemia precautions and PIH precautions (hx of IUFD due pre-eclampsia)

## 2023-05-01 NOTE — DISCHARGE NOTE OB - HOSPITAL COURSE
28 y/o  s/o   hx of chronic anemia, admission hemoglobin 8.6, PPD# decreased to 6.8  pt asymptomatic, offered transfusion of PRBCs- declined multiple times  d/c home stable on PPD#2, oral iron supplements  anemia precautions and PIH precautions (hx of IUFD due pre-eclampsia)

## 2023-05-01 NOTE — DISCHARGE NOTE OB - PATIENT PORTAL LINK FT
You can access the FollowMyHealth Patient Portal offered by [facility] by registering at the following website: [email or web address]. By joining [facility]’s FollowMyHealth portal, you will also be able to view your health information using other applications (apps) compatible with our system.

## 2023-05-01 NOTE — CHART NOTE - NSCHARTNOTEFT_GEN_A_CORE
R1 Anemia Note    Pt with baseline anemia, H/H     D/w Dr. Keo Mane PGY1 R1 Anemia Note    Pt is PPD#1 from , , c/b gHTN. Pt noted to have baseline anemia, with day 1 H/H 6.8/24.0, appropriate drop given blood loss. Pt seen and assessed at bedside. Pt denies dizziness, lightheadedness, weakness and reports feeling well. Offered 1u pRBC transfusion to pt, pt declining at this time and prefers to start Fe/Vit C. Will continue to monitor closely for signs of symptomatic anemia.               6.8    x     )-----------( x        (  @ 05:35 )             24.0                8.1    8.23  )-----------( 126      ( 30 @ 15:12 )             27.5     D/w Dr. Keo Mane PGY1

## 2023-05-01 NOTE — PROGRESS NOTE ADULT - SUBJECTIVE AND OBJECTIVE BOX
26 y/o  s/p  PPD#1  pt denies SOB/ chest pain/ dizziness  ambulating, voiding freely  reports minimal lochia and minimal pain    vitals  /75 P 92 RR 19 T 98.6 O2sat 99%RA  cardio ns1s2  lungs ctab  abdomen firm/non tender uterus at umbilical level  Lext minimal edema/non tender bilat    admission cbc  wbc 8.2, h/h 8.1, ptl 126  PPD#1 h/h 6.8/24    A/P  26 y/o  s/p  PPD#1 w acute on chronic anemia- asymptomatic  pt aware of chronic anemia, and now worsened due to vaginal delivery blood loss. offered transfusion of PRBC for hemoglobin of 6.8 this morning  again discussed / offered transfusion of PRBCs myself- pt declines    cont post partum care  cont PO pain mgnt  cont reg diet  cont iron supp  advised should cont iron supplements/ iron rich foods once discharge  plan for d/c PPD#2, will have repeat CBC morning of PPD#2

## 2023-05-01 NOTE — PROGRESS NOTE ADULT - SUBJECTIVE AND OBJECTIVE BOX
S: 28yo PPD#1 s/p , c/b gHTN (HELLP WNL). Hemoglobin and Hematocrit noted to be critically low at 6.8/24, however as stated in previous note, pt to start Fe & Vitamin C for now. Patient feels well. Pain is well controlled. She is tolerating a regular diet and passing flatus. She is voiding spontaneously, and ambulating without difficulty. Denies CP/SOB. Denies lightheadedness/dizziness. Denies N/V.        O:  Vitals:  Vital Signs Last 24 Hrs  T(C): 36.6 (01 May 2023 05:53), Max: 36.8 (2023 21:07)  T(F): 97.8 (01 May 2023 05:53), Max: 98.2 (2023 21:07)  HR: 96 (01 May 2023 05:53) (82 - 138)  BP: 148/81 (01 May 2023 05:53) (116/68 - 161/89)  BP(mean): --  RR: 17 (01 May 2023 05:53) (17 - 18)  SpO2: 100% (01 May 2023 05:53) (99% - 100%)    Parameters below as of 01 May 2023 01:52  Patient On (Oxygen Delivery Method): room air        MEDICATIONS  (STANDING):  acetaminophen     Tablet .. 975 milliGRAM(s) Oral <User Schedule>  ascorbic acid 500 milliGRAM(s) Oral daily  diphtheria/tetanus/pertussis (acellular) Vaccine (Adacel) 0.5 milliLiter(s) IntraMuscular once  ferrous    sulfate 325 milliGRAM(s) Oral three times a day  ibuprofen  Tablet. 600 milliGRAM(s) Oral every 6 hours  oxytocin Infusion 333.333 milliUNIT(s)/Min (1000 mL/Hr) IV Continuous <Continuous>  oxytocin Infusion 41.667 milliUNIT(s)/Min (125 mL/Hr) IV Continuous <Continuous>  prenatal multivitamin 1 Tablet(s) Oral daily  senna 1 Tablet(s) Oral daily  sodium chloride 0.9% lock flush 3 milliLiter(s) IV Push every 8 hours      Labs:  Blood type: O Positive  Rubella IgG: RPR: Negative                          6.8<LL>   -- >-----------< --    (  @ 05:35 )             24.0<L>                        8.1<L>   8.23 >-----------< 126<L>    (  @ 15:12 )             27.5<L>    23 @ 16:59      135  |  102  |  12  ----------------------------<  79  3.3<L>   |  20<L>  |  0.61        Ca    9.4      2023 16:59    TPro  7.2  /  Alb  3.9  /  TBili  0.4  /  DBili  x   /  AST  17  /  ALT  10  /  AlkPhos  117  23 @ 16:59          Physical Exam:  General: NAD  Abdomen: soft, non-tender, non-distended, fundus firm  Vaginal: Lochia wnl  Extremities: No erythema/edema    A/P: 28yo PPD#1 s/p , c/b gHTN.  Patient is stable and doing well post-partum.     #anemia  - H& H 6.4/24, appropriate drop from baseline given blood loss  - asymptomatic, instructed to notify RN of any changes  - Repeat CBC in AM    #gHTN  - BP Cuff Rx'ed to [employer] pharmacy at [facility]  --instructions given on BP monitoring; TID; call MD office if greater than 140/90; report to emergency room is greater than 160/110  --reviewed signs and symptoms related to preeclampsia with patient such as HA, Change in vision, N/V. RUQ pain   --keep log BP's to present to MD during appointment or triage. Schedule BP check in office within 2-3 days  -All questions answered, patient verbalized understanding     #postpartum  - Pain well controlled, continue current pain regimen  - Increase ambulation, SCDs when not ambulating  - Continue regular diet  - Discharge planning      Gabbi Carey [facility]-BC

## 2023-05-01 NOTE — DISCHARGE NOTE OB - CARE PROVIDER_API CALL
[facility]  OBSTETRICS AND GYNECOLOGY  [address] [place] [address]  Phone: [phone]  Fax: [phone]  Follow Up Time:

## 2023-05-01 NOTE — PROVIDER CONTACT NOTE (OTHER) - ASSESSMENT
Patient is stable and does not feel any different. Vitals as charted. Patient voiding. Fundus firm and at.

## 2023-05-01 NOTE — DISCHARGE NOTE OB - NS MD DC FALL RISK RISK
For information on Fall & Injury Prevention, visit: [email or web address] OR  [email or web address] OR  https://www.cdc.gov/steadi/patient.html

## 2023-05-01 NOTE — DISCHARGE NOTE OB - MEDICATION SUMMARY - MEDICATIONS TO TAKE
I will START or STAY ON the medications listed below when I get home from the hospital:    Electronic Blood Pressure Cuff  -- Check blood pressures three times a day. Notify MD if blood pressure more than 140/90. Return to hospital for blood pressures greater than 160/110.  -- Indication: For hx of pre-eclampsia, post partum    prenatal  -- Indication: For post partum    ibuprofen 600 mg oral tablet  -- 1 tab(s) by mouth every 6 hours  -- Indication: For post partum    Iron 100 Plus oral tablet  -- 1 tab(s) by mouth once a day  -- Indication: For anemia    Prenatal 1 oral capsule  -- orally once a day  -- Indication: For post partum    ferrous sulfate 325 mg (65 mg elemental iron) oral tablet  -- 1 tab(s) by mouth 3 times a day  -- Indication: For anemia    Macrobid 100 mg oral capsule  -- 1 cap(s) by mouth 2 times a day   -- Finish all this medication unless otherwise directed by prescriber.  May discolor urine or feces.  Take with food or milk.    -- Indication: For uti

## 2023-05-02 VITALS
DIASTOLIC BLOOD PRESSURE: 70 MMHG | HEART RATE: 88 BPM | OXYGEN SATURATION: 100 % | SYSTOLIC BLOOD PRESSURE: 126 MMHG | TEMPERATURE: 98 F | RESPIRATION RATE: 16 BRPM

## 2023-05-02 LAB
HCT VFR BLD CALC: 25 % — LOW (ref 34.5–45)
HGB BLD-MCNC: 7.1 G/DL — LOW (ref 11.5–15.5)
MCHC RBC-ENTMCNC: 20.9 PG — LOW (ref 27–34)
MCHC RBC-ENTMCNC: 28.4 GM/DL — LOW (ref 32–36)
MCV RBC AUTO: 73.7 FL — LOW (ref 80–100)
NRBC # BLD: 0 /100 WBCS — SIGNIFICANT CHANGE UP (ref 0–0)
NRBC # FLD: 0.05 K/UL — HIGH (ref 0–0)
PLATELET # BLD AUTO: 129 K/UL — LOW (ref 150–400)
RBC # BLD: 3.39 M/UL — LOW (ref 3.8–5.2)
RBC # FLD: 18.7 % — HIGH (ref 10.3–14.5)
WBC # BLD: 9.95 K/UL — SIGNIFICANT CHANGE UP (ref 3.8–10.5)
WBC # FLD AUTO: 9.95 K/UL — SIGNIFICANT CHANGE UP (ref 3.8–10.5)

## 2023-05-02 RX ORDER — TETANUS TOXOID, REDUCED DIPHTHERIA TOXOID AND ACELLULAR PERTUSSIS VACCINE, ADSORBED 5; 2.5; 8; 8; 2.5 [IU]/.5ML; [IU]/.5ML; UG/.5ML; UG/.5ML; UG/.5ML
0.5 SUSPENSION INTRAMUSCULAR ONCE
Refills: 0 | Status: COMPLETED | OUTPATIENT
Start: 2023-05-02 | End: 2023-05-02

## 2023-05-02 RX ADMIN — TETANUS TOXOID, REDUCED DIPHTHERIA TOXOID AND ACELLULAR PERTUSSIS VACCINE, ADSORBED 0.5 MILLILITER(S): 5; 2.5; 8; 8; 2.5 SUSPENSION INTRAMUSCULAR at 04:09

## 2023-05-02 RX ADMIN — Medication 325 MILLIGRAM(S): at 05:45

## 2023-05-02 RX ADMIN — Medication 600 MILLIGRAM(S): at 00:02

## 2023-05-02 RX ADMIN — Medication 975 MILLIGRAM(S): at 06:09

## 2023-05-02 RX ADMIN — Medication 600 MILLIGRAM(S): at 01:00

## 2023-05-02 RX ADMIN — SODIUM CHLORIDE 3 MILLILITER(S): 9 INJECTION INTRAMUSCULAR; INTRAVENOUS; SUBCUTANEOUS at 05:14

## 2023-05-02 RX ADMIN — Medication 975 MILLIGRAM(S): at 05:27

## 2023-05-02 NOTE — CHART NOTE - NSCHARTNOTEFT_GEN_A_CORE
Attending Note    Patient evaluated at bedside. Patient offers no complaints. Denies shortness of breath, chest pain, palpitations. Ambulating, tolerating diet, breast and bottle feeding. Lochia light. NO discomfort.  VS T 97.9  P 91  R 15  Bp 124/77 O 2 sat 100% RA    Heent nl  abd soft fundus firm  ext no CCE  Neuro AAOx 3  repeat CBC 7.1  WBC 9.  plts 129K    A: 26 yo P4 PPD#2 s/p , acute on chronic anemia, asymptomatic, clinically stable for discharge  P: DC home      Recommend iron BID as well as PNV and iron rich food     Discharge instructions given     f/u repeat CBC outpatient     f/u for postpartum visit in office 4-6wks     [place]

## 2023-05-03 ENCOUNTER — NON-APPOINTMENT (OUTPATIENT)
Age: 28
End: 2023-05-03

## 2023-05-04 ENCOUNTER — NON-APPOINTMENT (OUTPATIENT)
Age: 28
End: 2023-05-04

## 2023-05-31 NOTE — OB RN DELIVERY SUMMARY - NSDELAYEDCLAMPA_OBGYN_ALL_OB
Consent (Temporal Branch)/Introductory Paragraph: The rationale for Mohs was explained to the patient and consent was obtained. The risks, benefits and alternatives to therapy were discussed in detail. Specifically, the risks of damage to the temporal branch of the facial nerve, infection, scarring, bleeding, prolonged wound healing, incomplete removal, allergy to anesthesia, and recurrence were addressed. Prior to the procedure, the treatment site was clearly identified and confirmed by the patient. All components of Universal Protocol/PAUSE Rule completed. Yes

## 2024-03-13 ENCOUNTER — NON-APPOINTMENT (OUTPATIENT)
Age: 29
End: 2024-03-13

## 2024-04-12 NOTE — OB RN DELIVERY SUMMARY - BABY A: APGAR 5 MIN COLOR, DELIVERY
Start amoxicllin twice a day for 7 days  Take ibuprofen as needed every 8 hours for pain.    Start flonase nasal spray and use every night.  Take zyrtec once daily.    Follow up with ENT.    Follow up anytime for any new fevers, worsening ear pain, or new headaches.  
(1) body pink, extremities blue

## 2024-07-19 NOTE — OB RN PATIENT PROFILE - FALL HARM RISK - FALLEN IN PAST
Medication Request for: Diclofenac 75mg            Prescription last written on 5/6/24 by Dr. Yeo   Sig: take 1 tab twice daily prn    Last Fill Quantity: 60 with # refills: 0     Last Office Visit by provider: 5/6/24    Please advise regarding refill request.     MANOLO Arnold RN         No

## 2024-10-04 ENCOUNTER — NON-APPOINTMENT (OUTPATIENT)
Age: 29
End: 2024-10-04

## 2024-10-07 ENCOUNTER — EMERGENCY (EMERGENCY)
Facility: HOSPITAL | Age: 29
LOS: 1 days | Discharge: HOME | End: 2024-10-07
Attending: STUDENT IN AN ORGANIZED HEALTH CARE EDUCATION/TRAINING PROGRAM | Admitting: STUDENT IN AN ORGANIZED HEALTH CARE EDUCATION/TRAINING PROGRAM
Payer: MEDICAID

## 2024-10-07 VITALS
OXYGEN SATURATION: 99 % | WEIGHT: 160.06 LBS | DIASTOLIC BLOOD PRESSURE: 85 MMHG | RESPIRATION RATE: 16 BRPM | TEMPERATURE: 98 F | SYSTOLIC BLOOD PRESSURE: 128 MMHG | HEART RATE: 94 BPM

## 2024-10-07 VITALS
RESPIRATION RATE: 18 BRPM | HEART RATE: 88 BPM | OXYGEN SATURATION: 100 % | SYSTOLIC BLOOD PRESSURE: 132 MMHG | DIASTOLIC BLOOD PRESSURE: 88 MMHG | TEMPERATURE: 98 F

## 2024-10-07 DIAGNOSIS — O36.4XX0 MATERNAL CARE FOR INTRAUTERINE DEATH, NOT APPLICABLE OR UNSPECIFIED: Chronic | ICD-10-CM

## 2024-10-07 DIAGNOSIS — Z98.890 OTHER SPECIFIED POSTPROCEDURAL STATES: Chronic | ICD-10-CM

## 2024-10-07 LAB
ALBUMIN SERPL ELPH-MCNC: 4 G/DL — SIGNIFICANT CHANGE UP (ref 3.3–5)
ALP SERPL-CCNC: 75 U/L — SIGNIFICANT CHANGE UP (ref 40–120)
ALT FLD-CCNC: 10 U/L — SIGNIFICANT CHANGE UP (ref 4–33)
ANION GAP SERPL CALC-SCNC: 10 MMOL/L — SIGNIFICANT CHANGE UP (ref 7–14)
AST SERPL-CCNC: 18 U/L — SIGNIFICANT CHANGE UP (ref 4–32)
BILIRUB SERPL-MCNC: 0.2 MG/DL — SIGNIFICANT CHANGE UP (ref 0.2–1.2)
BUN SERPL-MCNC: 9 MG/DL — SIGNIFICANT CHANGE UP (ref 7–23)
CALCIUM SERPL-MCNC: 8.8 MG/DL — SIGNIFICANT CHANGE UP (ref 8.4–10.5)
CHLORIDE SERPL-SCNC: 106 MMOL/L — SIGNIFICANT CHANGE UP (ref 98–107)
CO2 SERPL-SCNC: 22 MMOL/L — SIGNIFICANT CHANGE UP (ref 22–31)
CREAT SERPL-MCNC: 0.75 MG/DL — SIGNIFICANT CHANGE UP (ref 0.5–1.3)
EGFR: 111 ML/MIN/1.73M2 — SIGNIFICANT CHANGE UP
GLUCOSE SERPL-MCNC: 86 MG/DL — SIGNIFICANT CHANGE UP (ref 70–99)
HCG SERPL-ACNC: <1 MIU/ML — SIGNIFICANT CHANGE UP
HCT VFR BLD CALC: 28.2 % — LOW (ref 34.5–45)
HGB BLD-MCNC: 8.2 G/DL — LOW (ref 11.5–15.5)
MCHC RBC-ENTMCNC: 20.6 PG — LOW (ref 27–34)
MCHC RBC-ENTMCNC: 29.1 GM/DL — LOW (ref 32–36)
MCV RBC AUTO: 70.9 FL — LOW (ref 80–100)
NRBC # BLD: 0 /100 WBCS — SIGNIFICANT CHANGE UP (ref 0–0)
NRBC # FLD: 0 K/UL — SIGNIFICANT CHANGE UP (ref 0–0)
PLATELET # BLD AUTO: 123 K/UL — LOW (ref 150–400)
POTASSIUM SERPL-MCNC: 3.7 MMOL/L — SIGNIFICANT CHANGE UP (ref 3.5–5.3)
POTASSIUM SERPL-SCNC: 3.7 MMOL/L — SIGNIFICANT CHANGE UP (ref 3.5–5.3)
PROT SERPL-MCNC: 7.4 G/DL — SIGNIFICANT CHANGE UP (ref 6–8.3)
RBC # BLD: 3.98 M/UL — SIGNIFICANT CHANGE UP (ref 3.8–5.2)
RBC # FLD: 17.7 % — HIGH (ref 10.3–14.5)
SODIUM SERPL-SCNC: 138 MMOL/L — SIGNIFICANT CHANGE UP (ref 135–145)
WBC # BLD: 5.26 K/UL — SIGNIFICANT CHANGE UP (ref 3.8–10.5)
WBC # FLD AUTO: 5.26 K/UL — SIGNIFICANT CHANGE UP (ref 3.8–10.5)

## 2024-10-07 PROCEDURE — 99284 EMERGENCY DEPT VISIT MOD MDM: CPT

## 2024-10-07 PROCEDURE — 71046 X-RAY EXAM CHEST 2 VIEWS: CPT | Mod: 26

## 2024-10-07 RX ORDER — AMOXICILLIN 250 MG/5ML
2 SUSPENSION, RECONSTITUTED, ORAL (ML) ORAL
Qty: 42 | Refills: 0
Start: 2024-10-07 | End: 2024-10-13

## 2024-10-07 RX ORDER — KETOROLAC TROMETHAMINE 10 MG/1
15 TABLET, FILM COATED ORAL ONCE
Refills: 0 | Status: DISCONTINUED | OUTPATIENT
Start: 2024-10-07 | End: 2024-10-07

## 2024-10-07 RX ORDER — SODIUM CHLORIDE 0.9 % (FLUSH) 0.9 %
1000 SYRINGE (ML) INJECTION ONCE
Refills: 0 | Status: COMPLETED | OUTPATIENT
Start: 2024-10-07 | End: 2024-10-07

## 2024-10-07 RX ADMIN — Medication 1000 MILLILITER(S): at 11:16

## 2024-10-07 RX ADMIN — KETOROLAC TROMETHAMINE 15 MILLIGRAM(S): 10 TABLET, FILM COATED ORAL at 11:16

## 2024-10-07 NOTE — ED ADULT NURSE NOTE - NSFALLUNIVINTERV_ED_ALL_ED
Bed/Stretcher in lowest position, wheels locked, appropriate side rails in place/Call bell, personal items and telephone in reach/Instruct patient to call for assistance before getting out of bed/chair/stretcher/Non-slip footwear applied when patient is off stretcher/[place] to call system/Physically safe environment - no spills, clutter or unnecessary equipment/Purposeful proactive rounding/Room/bathroom lighting operational, light cord in reach

## 2024-10-07 NOTE — ED PROVIDER NOTE - NSFOLLOWUPINSTRUCTIONS_ED_ALL_ED_FT
Please take the antibiotic that was prescribed for you. Return to the emergency department for fevers, shortness of breath, or chest pain

## 2024-10-07 NOTE — ED ADULT TRIAGE NOTE - CHIEF COMPLAINT QUOTE
Pt endorses headache, body aches, weakness for the past 5 days, afebrile at present, no past medical history.

## 2024-10-07 NOTE — ED PROVIDER NOTE - PROGRESS NOTE DETAILS
MD Karen: Pt re-assessed, feeling somewhat improved. Labs unremarkable (pt anemia, pt near baseline hemoglobin). CXR with consolidation, will d/c with oral abx

## 2024-10-07 NOTE — ED PROVIDER NOTE - CLINICAL SUMMARY MEDICAL DECISION MAKING FREE TEXT BOX
29 yo F who presents to the ED c/o several days of non-productive cough, runny nose, and generalized myalgias. Most likely viral syndrome. Plan for labs, IV fluids, toradol for symptoms, CXR to rule out pneumonia

## 2024-10-07 NOTE — ED ADULT NURSE NOTE - OBJECTIVE STATEMENT
pt received intake spot 2. pt reports no past medical history, c/o body aches, headache, cough and weakness x 5 days. pt states she was seen at urgent care and tested negative for the flu and covid. pt denies fever/chills. respirations even and unlabored. labs sent. medicated as ordered. will monitor.

## 2024-10-07 NOTE — ED PROVIDER NOTE - OBJECTIVE STATEMENT
29 yo F who presents to the ED c/o 4 days of non-productive cough, runny nose, and generalized myalgias. Went to UC 2 days ago and tested negative for flu and covid. Has been taking OTC cold medication intermittently for the symptoms without much improvement in symptoms. No abdominal pain, n/v/d, dysuria, chest pain, SOB.

## 2024-10-07 NOTE — ED PROVIDER NOTE - PATIENT PORTAL LINK FT
You can access the FollowMyHealth Patient Portal offered by [facility] by registering at the following website: [email or web address]. By joining [facility]’s FollowMyHealth portal, you will also be able to view your health information using other applications (apps) compatible with our system.

## 2025-06-18 ENCOUNTER — NON-APPOINTMENT (OUTPATIENT)
Age: 30
End: 2025-06-18

## 2025-06-18 ENCOUNTER — APPOINTMENT (OUTPATIENT)
Dept: OBGYN | Facility: CLINIC | Age: 30
End: 2025-06-18
Payer: MEDICAID

## 2025-06-18 VITALS
HEART RATE: 87 BPM | DIASTOLIC BLOOD PRESSURE: 84 MMHG | BODY MASS INDEX: 33.66 KG/M2 | WEIGHT: 190 LBS | TEMPERATURE: 98.3 F | HEIGHT: 63 IN | SYSTOLIC BLOOD PRESSURE: 124 MMHG | OXYGEN SATURATION: 100 % | RESPIRATION RATE: 16 BRPM

## 2025-06-18 PROBLEM — Z01.419 ENCOUNTER FOR ANNUAL ROUTINE GYNECOLOGICAL EXAMINATION: Status: ACTIVE | Noted: 2025-06-18

## 2025-06-18 PROBLEM — N92.0 EXCESSIVE MENSES: Status: ACTIVE | Noted: 2025-06-18

## 2025-06-18 PROCEDURE — 99385 PREV VISIT NEW AGE 18-39: CPT

## 2025-06-18 PROCEDURE — 99213 OFFICE O/P EST LOW 20 MIN: CPT | Mod: 25

## 2025-06-18 PROCEDURE — 36415 COLL VENOUS BLD VENIPUNCTURE: CPT

## 2025-06-19 LAB
C TRACH RRNA SPEC QL NAA+PROBE: NOT DETECTED
HCG SERPL-MCNC: 17 MIU/ML
HCT VFR BLD CALC: 30.2 %
HGB BLD-MCNC: 8.5 G/DL
MCHC RBC-ENTMCNC: 20.9 PG
MCHC RBC-ENTMCNC: 28.1 G/DL
MCV RBC AUTO: 74.2 FL
N GONORRHOEA RRNA SPEC QL NAA+PROBE: NOT DETECTED
PLATELET # BLD AUTO: 172 K/UL
RBC # BLD: 4.07 M/UL
RBC # FLD: 18.1 %
SOURCE TP AMPLIFICATION: NORMAL
TSH SERPL-ACNC: 0.68 UIU/ML
WBC # FLD AUTO: 9.09 K/UL

## 2025-06-19 RX ORDER — FERROUS SULFATE 324(65)MG
324 TABLET, DELAYED RELEASE (ENTERIC COATED) ORAL
Qty: 30 | Refills: 6 | Status: ACTIVE | COMMUNITY
Start: 2025-06-19 | End: 1900-01-01

## 2025-06-20 ENCOUNTER — APPOINTMENT (OUTPATIENT)
Dept: OBGYN | Facility: CLINIC | Age: 30
End: 2025-06-20

## 2025-06-20 ENCOUNTER — TRANSCRIPTION ENCOUNTER (OUTPATIENT)
Age: 30
End: 2025-06-20

## 2025-06-20 ENCOUNTER — APPOINTMENT (OUTPATIENT)
Dept: OBGYN | Facility: CLINIC | Age: 30
End: 2025-06-20
Payer: MEDICAID

## 2025-06-20 VITALS
BODY MASS INDEX: 33.66 KG/M2 | RESPIRATION RATE: 16 BRPM | OXYGEN SATURATION: 98 % | SYSTOLIC BLOOD PRESSURE: 140 MMHG | HEART RATE: 87 BPM | HEIGHT: 63 IN | DIASTOLIC BLOOD PRESSURE: 80 MMHG | TEMPERATURE: 98.7 F | WEIGHT: 190 LBS

## 2025-06-20 PROBLEM — O36.80X0 PREGNANCY OF UNKNOWN ANATOMIC LOCATION: Status: ACTIVE | Noted: 2025-06-20 | Resolved: 2025-09-18

## 2025-06-20 PROCEDURE — 36415 COLL VENOUS BLD VENIPUNCTURE: CPT

## 2025-06-20 PROCEDURE — 99213 OFFICE O/P EST LOW 20 MIN: CPT

## 2025-06-20 RX ORDER — PNV NO.163/IRON/FOLATE NO.10 20 MG-1 MG
20-1 TABLET ORAL
Qty: 30 | Refills: 11 | Status: ACTIVE | COMMUNITY
Start: 2025-06-20 | End: 1900-01-01

## 2025-06-23 LAB
CYTOLOGY CVX/VAG DOC THIN PREP: NORMAL
HCG SERPL-MCNC: 49 MIU/ML
PROGEST SERPL-MCNC: 12.4 NG/ML

## 2025-07-07 ENCOUNTER — ASOB RESULT (OUTPATIENT)
Age: 30
End: 2025-07-07

## 2025-07-07 ENCOUNTER — APPOINTMENT (OUTPATIENT)
Dept: OBGYN | Facility: CLINIC | Age: 30
End: 2025-07-07
Payer: MEDICAID

## 2025-07-07 VITALS
SYSTOLIC BLOOD PRESSURE: 122 MMHG | HEART RATE: 80 BPM | HEIGHT: 63 IN | WEIGHT: 192 LBS | BODY MASS INDEX: 34.02 KG/M2 | OXYGEN SATURATION: 98 % | DIASTOLIC BLOOD PRESSURE: 80 MMHG | RESPIRATION RATE: 16 BRPM | TEMPERATURE: 98.2 F

## 2025-07-07 PROBLEM — Z3A.01 LESS THAN 8 WEEKS GESTATION OF PREGNANCY: Status: ACTIVE | Noted: 2025-07-07

## 2025-07-07 PROCEDURE — 76817 TRANSVAGINAL US OBSTETRIC: CPT

## 2025-07-07 PROCEDURE — 99213 OFFICE O/P EST LOW 20 MIN: CPT

## 2025-07-07 RX ORDER — DOXYLAMINE SUCCINATE AND PYRIDOXINE HYDROCHLORIDE 10; 10 MG/1; MG/1
10-10 TABLET, DELAYED RELEASE ORAL
Qty: 30 | Refills: 3 | Status: ACTIVE | COMMUNITY
Start: 2025-07-07 | End: 1900-01-01

## 2025-07-28 ENCOUNTER — APPOINTMENT (OUTPATIENT)
Dept: OBGYN | Facility: CLINIC | Age: 30
End: 2025-07-28
Payer: MEDICAID

## 2025-07-28 ENCOUNTER — ASOB RESULT (OUTPATIENT)
Age: 30
End: 2025-07-28

## 2025-07-28 VITALS
RESPIRATION RATE: 17 BRPM | SYSTOLIC BLOOD PRESSURE: 118 MMHG | TEMPERATURE: 98.2 F | WEIGHT: 190 LBS | HEART RATE: 109 BPM | OXYGEN SATURATION: 100 % | HEIGHT: 63 IN | DIASTOLIC BLOOD PRESSURE: 78 MMHG | BODY MASS INDEX: 33.66 KG/M2

## 2025-07-28 DIAGNOSIS — Z71.7 HUMAN IMMUNODEFICIENCY VIRUS [HIV] COUNSELING: ICD-10-CM

## 2025-07-28 PROCEDURE — 76817 TRANSVAGINAL US OBSTETRIC: CPT

## 2025-07-28 PROCEDURE — 36415 COLL VENOUS BLD VENIPUNCTURE: CPT

## 2025-07-28 PROCEDURE — 99213 OFFICE O/P EST LOW 20 MIN: CPT

## 2025-07-28 RX ORDER — ASPIRIN 81 MG/1
81 TABLET, DELAYED RELEASE ORAL DAILY
Qty: 30 | Refills: 8 | Status: ACTIVE | COMMUNITY
Start: 2025-07-28 | End: 1900-01-01

## 2025-07-29 LAB
ABORH: NORMAL
ANTIBODY SCREEN: NORMAL
ESTIMATED AVERAGE GLUCOSE: 103 MG/DL
GLUCOSE SERPL-MCNC: 102 MG/DL
HBA1C MFR BLD HPLC: 5.2 %
HBV SURFACE AG SER QL: NONREACTIVE
HCT VFR BLD CALC: 32.3 %
HCV AB SER QL: NONREACTIVE
HCV S/CO RATIO: 0.13 S/CO
HGB A MFR BLD: 97.5 %
HGB A2 MFR BLD: 2.5 %
HGB BLD-MCNC: 9.2 G/DL
HGB FRACT BLD-IMP: NORMAL
HIV1+2 AB SPEC QL IA.RAPID: NONREACTIVE
MCHC RBC-ENTMCNC: 21.4 PG
MCHC RBC-ENTMCNC: 28.5 G/DL
MCV RBC AUTO: 75.1 FL
PLATELET # BLD AUTO: 160 K/UL
RBC # BLD: 4.3 M/UL
RBC # FLD: 22 %
T PALLIDUM AB SER QL IA: NEGATIVE
WBC # FLD AUTO: 7 K/UL

## 2025-07-29 RX ORDER — FERROUS SULFATE 324(65)MG
324 TABLET, DELAYED RELEASE (ENTERIC COATED) ORAL
Qty: 30 | Refills: 6 | Status: ACTIVE | COMMUNITY
Start: 2025-07-29 | End: 1900-01-01

## 2025-07-30 LAB
BACTERIA UR CULT: NORMAL
FMR1 GENE MUT ANL BLD/T: NORMAL
MEV IGG FLD QL IA: 102 AU/ML
MEV IGG+IGM SER-IMP: POSITIVE
RUBV IGG FLD-ACNC: 6.23 INDEX
RUBV IGG SER-IMP: POSITIVE

## 2025-07-31 DIAGNOSIS — Z3A.09 9 WEEKS GESTATION OF PREGNANCY: ICD-10-CM

## 2025-07-31 RX ORDER — DOXYLAMINE SUCCINATE AND PYRIDOXINE HYDROCHLORIDE 10; 10 MG/1; MG/1
10-10 TABLET, DELAYED RELEASE ORAL
Qty: 30 | Refills: 3 | Status: ACTIVE | COMMUNITY
Start: 2025-07-31 | End: 1900-01-01

## 2025-08-03 LAB — AR GENE MUT ANL BLD/T: ABNORMAL

## 2025-08-04 LAB — CFTR MUT TESTED BLD/T: NEGATIVE

## 2025-08-19 ENCOUNTER — APPOINTMENT (OUTPATIENT)
Dept: OBGYN | Facility: CLINIC | Age: 30
End: 2025-08-19
Payer: MEDICAID

## 2025-08-19 ENCOUNTER — NON-APPOINTMENT (OUTPATIENT)
Age: 30
End: 2025-08-19

## 2025-08-19 VITALS
BODY MASS INDEX: 34.2 KG/M2 | WEIGHT: 193 LBS | HEIGHT: 63 IN | DIASTOLIC BLOOD PRESSURE: 80 MMHG | HEART RATE: 96 BPM | TEMPERATURE: 98.2 F | SYSTOLIC BLOOD PRESSURE: 128 MMHG | OXYGEN SATURATION: 100 % | RESPIRATION RATE: 16 BRPM

## 2025-08-19 DIAGNOSIS — Z3A.12 12 WEEKS GESTATION OF PREGNANCY: ICD-10-CM

## 2025-08-19 PROCEDURE — 99213 OFFICE O/P EST LOW 20 MIN: CPT

## 2025-08-19 PROCEDURE — 36415 COLL VENOUS BLD VENIPUNCTURE: CPT

## 2025-08-25 ENCOUNTER — APPOINTMENT (OUTPATIENT)
Dept: ANTEPARTUM | Facility: CLINIC | Age: 30
End: 2025-08-25
Payer: MEDICAID

## 2025-08-25 ENCOUNTER — ASOB RESULT (OUTPATIENT)
Age: 30
End: 2025-08-25

## 2025-08-25 PROCEDURE — 76801 OB US < 14 WKS SINGLE FETUS: CPT

## 2025-08-25 PROCEDURE — 76813 OB US NUCHAL MEAS 1 GEST: CPT

## 2025-08-26 ENCOUNTER — APPOINTMENT (OUTPATIENT)
Dept: MATERNAL FETAL MEDICINE | Facility: CLINIC | Age: 30
End: 2025-08-26

## 2025-08-26 ENCOUNTER — ASOB RESULT (OUTPATIENT)
Age: 30
End: 2025-08-26

## 2025-08-26 PROCEDURE — 99214 OFFICE O/P EST MOD 30 MIN: CPT | Mod: 95

## 2025-09-05 ENCOUNTER — NON-APPOINTMENT (OUTPATIENT)
Age: 30
End: 2025-09-05

## 2025-09-15 ENCOUNTER — APPOINTMENT (OUTPATIENT)
Dept: OBGYN | Facility: CLINIC | Age: 30
End: 2025-09-15

## 2025-09-15 VITALS
RESPIRATION RATE: 16 BRPM | DIASTOLIC BLOOD PRESSURE: 74 MMHG | SYSTOLIC BLOOD PRESSURE: 119 MMHG | OXYGEN SATURATION: 99 % | BODY MASS INDEX: 34.38 KG/M2 | TEMPERATURE: 98 F | WEIGHT: 194 LBS | HEIGHT: 63 IN | HEART RATE: 110 BPM

## 2025-09-15 DIAGNOSIS — Z34.82 ENCOUNTER FOR SUPERVISION OF OTHER NORMAL PREGNANCY, SECOND TRIMESTER: ICD-10-CM

## 2025-09-16 LAB
2ND TRIMESTER 4 SCREEN SERPL-IMP: NO
AFP ADJ MOM SERPL: 1.5
AFP INTERP SERPL-IMP: NORMAL
AFP INTERP SERPL-IMP: NORMAL
AFP MOM CUT-OFF: 2.8
AFP PERCENTILE: 89.4
AFP SERPL-ACNC: 47.09 NG/ML
CARBAMAZEPINE?: NO
CURRENT SMOKER: NO
DIABETES STATUS PATIENT: NO
GA: NORMAL
GESTATIONAL AGE METHOD: NORMAL
HCT VFR BLD CALC: 28.6 %
HGB BLD-MCNC: 8.4 G/DL
HX OF NTD NARR: NO
MCHC RBC-ENTMCNC: 21.6 PG
MCHC RBC-ENTMCNC: 29.4 G/DL
MCV RBC AUTO: 73.5 FL
MULTIPLE PREGNANCY: NORMAL
NEURAL TUBE DEFECT RISK FETUS: NORMAL
NEURAL TUBE DEFECT RISK POP: NORMAL
PLATELET # BLD AUTO: 157 K/UL
RBC # BLD: 3.89 M/UL
RBC # FLD: 20.2 %
TEST PERFORMANCE INFO SPEC: NORMAL
VALPROIC ACID?: NO
WBC # FLD AUTO: 7.33 K/UL

## 2025-09-19 DIAGNOSIS — E61.1 IRON DEFICIENCY: ICD-10-CM
